# Patient Record
Sex: FEMALE | Race: OTHER | HISPANIC OR LATINO | URBAN - METROPOLITAN AREA
[De-identification: names, ages, dates, MRNs, and addresses within clinical notes are randomized per-mention and may not be internally consistent; named-entity substitution may affect disease eponyms.]

---

## 2022-06-26 ENCOUNTER — INPATIENT (INPATIENT)
Facility: HOSPITAL | Age: 47
LOS: 8 days | Discharge: HOME | End: 2022-07-05
Attending: INTERNAL MEDICINE | Admitting: INTERNAL MEDICINE
Payer: MEDICAID

## 2022-06-26 VITALS
RESPIRATION RATE: 18 BRPM | HEART RATE: 76 BPM | DIASTOLIC BLOOD PRESSURE: 73 MMHG | TEMPERATURE: 98 F | OXYGEN SATURATION: 100 % | SYSTOLIC BLOOD PRESSURE: 109 MMHG

## 2022-06-26 LAB
ALBUMIN SERPL ELPH-MCNC: 3.9 G/DL — SIGNIFICANT CHANGE UP (ref 3.3–5)
ALP SERPL-CCNC: 69 U/L — SIGNIFICANT CHANGE UP (ref 40–120)
ALT FLD-CCNC: 10 U/L — SIGNIFICANT CHANGE UP (ref 4–33)
ANION GAP SERPL CALC-SCNC: 11 MMOL/L — SIGNIFICANT CHANGE UP (ref 7–14)
AST SERPL-CCNC: 13 U/L — SIGNIFICANT CHANGE UP (ref 4–32)
BASOPHILS # BLD AUTO: 0 K/UL — SIGNIFICANT CHANGE UP (ref 0–0.2)
BASOPHILS NFR BLD AUTO: 0 % — SIGNIFICANT CHANGE UP (ref 0–2)
BILIRUB SERPL-MCNC: 0.3 MG/DL — SIGNIFICANT CHANGE UP (ref 0.2–1.2)
BUN SERPL-MCNC: 12 MG/DL — SIGNIFICANT CHANGE UP (ref 7–23)
CALCIUM SERPL-MCNC: 8.8 MG/DL — SIGNIFICANT CHANGE UP (ref 8.4–10.5)
CHLORIDE SERPL-SCNC: 104 MMOL/L — SIGNIFICANT CHANGE UP (ref 98–107)
CO2 SERPL-SCNC: 24 MMOL/L — SIGNIFICANT CHANGE UP (ref 22–31)
CREAT SERPL-MCNC: 0.56 MG/DL — SIGNIFICANT CHANGE UP (ref 0.5–1.3)
EGFR: 112 ML/MIN/1.73M2 — SIGNIFICANT CHANGE UP
EOSINOPHIL # BLD AUTO: 0.06 K/UL — SIGNIFICANT CHANGE UP (ref 0–0.5)
EOSINOPHIL NFR BLD AUTO: 0.9 % — SIGNIFICANT CHANGE UP (ref 0–6)
GLUCOSE SERPL-MCNC: 108 MG/DL — HIGH (ref 70–99)
HCT VFR BLD CALC: 33.3 % — LOW (ref 34.5–45)
HGB BLD-MCNC: 9.7 G/DL — LOW (ref 11.5–15.5)
IANC: 3.18 K/UL — SIGNIFICANT CHANGE UP (ref 1.8–7.4)
LYMPHOCYTES # BLD AUTO: 3.49 K/UL — HIGH (ref 1–3.3)
LYMPHOCYTES # BLD AUTO: 48.7 % — HIGH (ref 13–44)
MCHC RBC-ENTMCNC: 21.2 PG — LOW (ref 27–34)
MCHC RBC-ENTMCNC: 29.1 GM/DL — LOW (ref 32–36)
MCV RBC AUTO: 72.9 FL — LOW (ref 80–100)
MONOCYTES # BLD AUTO: 0.19 K/UL — SIGNIFICANT CHANGE UP (ref 0–0.9)
MONOCYTES NFR BLD AUTO: 2.6 % — SIGNIFICANT CHANGE UP (ref 2–14)
NEUTROPHILS # BLD AUTO: 3.36 K/UL — SIGNIFICANT CHANGE UP (ref 1.8–7.4)
NEUTROPHILS NFR BLD AUTO: 46.9 % — SIGNIFICANT CHANGE UP (ref 43–77)
PLATELET # BLD AUTO: 323 K/UL — SIGNIFICANT CHANGE UP (ref 150–400)
POTASSIUM SERPL-MCNC: 3.5 MMOL/L — SIGNIFICANT CHANGE UP (ref 3.5–5.3)
POTASSIUM SERPL-SCNC: 3.5 MMOL/L — SIGNIFICANT CHANGE UP (ref 3.5–5.3)
PROT SERPL-MCNC: 7 G/DL — SIGNIFICANT CHANGE UP (ref 6–8.3)
RBC # BLD: 4.57 M/UL — SIGNIFICANT CHANGE UP (ref 3.8–5.2)
RBC # FLD: 19.3 % — HIGH (ref 10.3–14.5)
SODIUM SERPL-SCNC: 139 MMOL/L — SIGNIFICANT CHANGE UP (ref 135–145)
WBC # BLD: 7.16 K/UL — SIGNIFICANT CHANGE UP (ref 3.8–10.5)
WBC # FLD AUTO: 7.16 K/UL — SIGNIFICANT CHANGE UP (ref 3.8–10.5)

## 2022-06-26 PROCEDURE — 72100 X-RAY EXAM L-S SPINE 2/3 VWS: CPT | Mod: 26

## 2022-06-26 PROCEDURE — 72074 X-RAY EXAM THORAC SPINE4/>VW: CPT | Mod: 26

## 2022-06-26 PROCEDURE — 99285 EMERGENCY DEPT VISIT HI MDM: CPT

## 2022-06-26 RX ORDER — DEXAMETHASONE 0.5 MG/5ML
10 ELIXIR ORAL ONCE
Refills: 0 | Status: COMPLETED | OUTPATIENT
Start: 2022-06-26 | End: 2022-06-26

## 2022-06-26 RX ORDER — MORPHINE SULFATE 50 MG/1
4 CAPSULE, EXTENDED RELEASE ORAL ONCE
Refills: 0 | Status: DISCONTINUED | OUTPATIENT
Start: 2022-06-26 | End: 2022-06-26

## 2022-06-26 RX ORDER — SODIUM CHLORIDE 9 MG/ML
1000 INJECTION INTRAMUSCULAR; INTRAVENOUS; SUBCUTANEOUS ONCE
Refills: 0 | Status: COMPLETED | OUTPATIENT
Start: 2022-06-26 | End: 2022-06-26

## 2022-06-26 RX ADMIN — Medication 102 MILLIGRAM(S): at 22:36

## 2022-06-26 RX ADMIN — MORPHINE SULFATE 4 MILLIGRAM(S): 50 CAPSULE, EXTENDED RELEASE ORAL at 22:35

## 2022-06-26 RX ADMIN — SODIUM CHLORIDE 1000 MILLILITER(S): 9 INJECTION INTRAMUSCULAR; INTRAVENOUS; SUBCUTANEOUS at 22:36

## 2022-06-26 NOTE — ED ADULT TRIAGE NOTE - CHIEF COMPLAINT QUOTE
pt c/o bilateral leg and neck pain X 1 month and back pain X 3 months. States had leg surgery on both legs 1 month ago and leg and neck pain started after, does not know name or surgery. Denies falls, trauma or injury.

## 2022-06-26 NOTE — ED PROVIDER NOTE - WET READ LAUNCH FT
S/w pt. Aware of recalls - added pt to EGD and Colon Recalls. Pt is now scheduled for f/u in September.    There are no Wet Read(s) to document.

## 2022-06-26 NOTE — ED PROVIDER NOTE - ATTENDING APP SHARED VISIT CONTRIBUTION OF CARE
48 yo with severe back pain to the T and L spine.  No radiation of the pain to the back of legs.  Has been present for the last 10 days and getting progressively worse.  No bowel or bladder problems and no weakness.    Gen: Well appearing in NAD  Head: NC/AT  Neck: trachea midline  Resp:  No distress  Ext: no deformities  Neuro:  A&O appears non focal  Skin:  Warm and dry as visualized  Psych:  Normal affect and mood     48 yo with sevre back pain.  Unsure of an exact etiology.  There are no signs of cord compression or GBS.  Due to degree of pain and inabilaity to contorl in ED will need admission for pain control and MRI and PT/OT

## 2022-06-26 NOTE — ED PROVIDER NOTE - CLINICAL SUMMARY MEDICAL DECISION MAKING FREE TEXT BOX
46 y/o female, had heat ablation to b/l medial aspect of thighs x 1 month ago for varicose veins, since then has been having severe back pain from t-l spine, + b/l generalized leg pain, went to [facility] 10 days ago received cortisone shots in b/l  thighs, however pain is not getting any better, + has been taking robaxin from pmd with no relief, denies any HA, neck pain, visual disturbances, f/c/n/v/d, chest pain, sob, abdominal pain, urinary symptoms, saddle anesthesia,  loss of bowel/bladder, recent travel, sick contact  on exam: RHETT MATHEW:  xr's show no acute pathology, pain meds, steropids given with no relief, will be admitted for inability to ambulate  pain meds, steorids, xr, reasses

## 2022-06-26 NOTE — ED PROVIDER NOTE - OBJECTIVE STATEMENT
48 y/o female, had heat ablation to b/l medial aspect of thighs x 1 month ago for varicose veins, since then has been having severe back pain from t-l spine, + b/l generalized leg pain, went to [facility] 10 days ago received cortisone shots in b/l  thighs, however pain is not getting any better, + has been taking robaxin from pmd with no relief, denies any HA, neck pain, visual disturbances, f/c/n/v/d, chest pain, sob, abdominal pain, urinary symptoms, saddle anesthesia,  loss of bowel/bladder, recent travel, sick contact

## 2022-06-26 NOTE — ED ADULT NURSE NOTE - OBJECTIVE STATEMENT
Patient came in with the complaints of bilateral leg pain, neck and back pain. As per patient, she had surgery done on both legs a month ago. No other complaints. Specimens collected and sent. Medications given as ordered. Patient tolerated well. Nursing care continues

## 2022-06-26 NOTE — ED PROVIDER NOTE - PHYSICAL EXAMINATION
msk: + mild TTP of lumbar/thoracic spine, no erythema, warmth, no rashes seen  neuro: + 5/5 strength b/l legs

## 2022-06-27 DIAGNOSIS — M54.16 RADICULOPATHY, LUMBAR REGION: ICD-10-CM

## 2022-06-27 DIAGNOSIS — I83.90 ASYMPTOMATIC VARICOSE VEINS OF UNSPECIFIED LOWER EXTREMITY: Chronic | ICD-10-CM

## 2022-06-27 DIAGNOSIS — R26.2 DIFFICULTY IN WALKING, NOT ELSEWHERE CLASSIFIED: ICD-10-CM

## 2022-06-27 DIAGNOSIS — M54.9 DORSALGIA, UNSPECIFIED: ICD-10-CM

## 2022-06-27 DIAGNOSIS — Z29.9 ENCOUNTER FOR PROPHYLACTIC MEASURES, UNSPECIFIED: ICD-10-CM

## 2022-06-27 DIAGNOSIS — D50.9 IRON DEFICIENCY ANEMIA, UNSPECIFIED: ICD-10-CM

## 2022-06-27 LAB
ANION GAP SERPL CALC-SCNC: 8 MMOL/L — SIGNIFICANT CHANGE UP (ref 7–14)
BASOPHILS # BLD AUTO: 0 K/UL — SIGNIFICANT CHANGE UP (ref 0–0.2)
BASOPHILS NFR BLD AUTO: 0 % — SIGNIFICANT CHANGE UP (ref 0–2)
BUN SERPL-MCNC: 10 MG/DL — SIGNIFICANT CHANGE UP (ref 7–23)
CALCIUM SERPL-MCNC: 8.6 MG/DL — SIGNIFICANT CHANGE UP (ref 8.4–10.5)
CHLORIDE SERPL-SCNC: 108 MMOL/L — HIGH (ref 98–107)
CO2 SERPL-SCNC: 20 MMOL/L — LOW (ref 22–31)
CREAT SERPL-MCNC: 0.43 MG/DL — LOW (ref 0.5–1.3)
CRP SERPL-MCNC: <3 MG/L — SIGNIFICANT CHANGE UP
EGFR: 119 ML/MIN/1.73M2 — SIGNIFICANT CHANGE UP
EOSINOPHIL # BLD AUTO: 0 K/UL — SIGNIFICANT CHANGE UP (ref 0–0.5)
EOSINOPHIL NFR BLD AUTO: 0 % — SIGNIFICANT CHANGE UP (ref 0–6)
ERYTHROCYTE [SEDIMENTATION RATE] IN BLOOD: 13 MM/HR — SIGNIFICANT CHANGE UP (ref 4–25)
FERRITIN SERPL-MCNC: 5 NG/ML — LOW (ref 15–150)
FOLATE SERPL-MCNC: 12 NG/ML — SIGNIFICANT CHANGE UP (ref 3.1–17.5)
GLUCOSE SERPL-MCNC: 154 MG/DL — HIGH (ref 70–99)
HCT VFR BLD CALC: 34.3 % — LOW (ref 34.5–45)
HGB BLD-MCNC: 10.3 G/DL — LOW (ref 11.5–15.5)
IANC: 5.31 K/UL — SIGNIFICANT CHANGE UP (ref 1.8–7.4)
IMM GRANULOCYTES NFR BLD AUTO: 0.3 % — SIGNIFICANT CHANGE UP (ref 0–1.5)
IRON SATN MFR SERPL: 12 UG/DL — LOW (ref 30–160)
IRON SATN MFR SERPL: 3 % — LOW (ref 14–50)
LYMPHOCYTES # BLD AUTO: 0.75 K/UL — LOW (ref 1–3.3)
LYMPHOCYTES # BLD AUTO: 12.1 % — LOW (ref 13–44)
MAGNESIUM SERPL-MCNC: 2.1 MG/DL — SIGNIFICANT CHANGE UP (ref 1.6–2.6)
MCHC RBC-ENTMCNC: 21.8 PG — LOW (ref 27–34)
MCHC RBC-ENTMCNC: 30 GM/DL — LOW (ref 32–36)
MCV RBC AUTO: 72.7 FL — LOW (ref 80–100)
MONOCYTES # BLD AUTO: 0.1 K/UL — SIGNIFICANT CHANGE UP (ref 0–0.9)
MONOCYTES NFR BLD AUTO: 1.6 % — LOW (ref 2–14)
NEUTROPHILS # BLD AUTO: 5.31 K/UL — SIGNIFICANT CHANGE UP (ref 1.8–7.4)
NEUTROPHILS NFR BLD AUTO: 86 % — HIGH (ref 43–77)
NRBC # BLD: 0 /100 WBCS — SIGNIFICANT CHANGE UP
NRBC # FLD: 0 K/UL — SIGNIFICANT CHANGE UP
PHOSPHATE SERPL-MCNC: 2.7 MG/DL — SIGNIFICANT CHANGE UP (ref 2.5–4.5)
PLATELET # BLD AUTO: 328 K/UL — SIGNIFICANT CHANGE UP (ref 150–400)
POTASSIUM SERPL-MCNC: 3.8 MMOL/L — SIGNIFICANT CHANGE UP (ref 3.5–5.3)
POTASSIUM SERPL-SCNC: 3.8 MMOL/L — SIGNIFICANT CHANGE UP (ref 3.5–5.3)
RBC # BLD: 4.68 M/UL — SIGNIFICANT CHANGE UP (ref 3.8–5.2)
RBC # BLD: 4.72 M/UL — SIGNIFICANT CHANGE UP (ref 3.8–5.2)
RBC # FLD: 19.5 % — HIGH (ref 10.3–14.5)
RETICS #: 55.2 K/UL — SIGNIFICANT CHANGE UP (ref 25–125)
RETICS/RBC NFR: 1.2 % — SIGNIFICANT CHANGE UP (ref 0.5–2.5)
SARS-COV-2 RNA SPEC QL NAA+PROBE: SIGNIFICANT CHANGE UP
SODIUM SERPL-SCNC: 136 MMOL/L — SIGNIFICANT CHANGE UP (ref 135–145)
TIBC SERPL-MCNC: 415 UG/DL — SIGNIFICANT CHANGE UP (ref 220–430)
UIBC SERPL-MCNC: 403 UG/DL — HIGH (ref 110–370)
VIT B12 SERPL-MCNC: 454 PG/ML — SIGNIFICANT CHANGE UP (ref 200–900)
WBC # BLD: 6.18 K/UL — SIGNIFICANT CHANGE UP (ref 3.8–10.5)
WBC # FLD AUTO: 6.18 K/UL — SIGNIFICANT CHANGE UP (ref 3.8–10.5)

## 2022-06-27 PROCEDURE — 99223 1ST HOSP IP/OBS HIGH 75: CPT | Mod: GC

## 2022-06-27 RX ORDER — GABAPENTIN 400 MG/1
100 CAPSULE ORAL THREE TIMES A DAY
Refills: 0 | Status: DISCONTINUED | OUTPATIENT
Start: 2022-06-27 | End: 2022-07-01

## 2022-06-27 RX ORDER — LIDOCAINE 4 G/100G
1 CREAM TOPICAL DAILY
Refills: 0 | Status: DISCONTINUED | OUTPATIENT
Start: 2022-06-27 | End: 2022-07-05

## 2022-06-27 RX ORDER — ENOXAPARIN SODIUM 100 MG/ML
40 INJECTION SUBCUTANEOUS EVERY 24 HOURS
Refills: 0 | Status: DISCONTINUED | OUTPATIENT
Start: 2022-06-27 | End: 2022-07-05

## 2022-06-27 RX ORDER — MORPHINE SULFATE 50 MG/1
3 CAPSULE, EXTENDED RELEASE ORAL EVERY 6 HOURS
Refills: 0 | Status: DISCONTINUED | OUTPATIENT
Start: 2022-06-27 | End: 2022-06-28

## 2022-06-27 RX ORDER — SENNA PLUS 8.6 MG/1
2 TABLET ORAL AT BEDTIME
Refills: 0 | Status: DISCONTINUED | OUTPATIENT
Start: 2022-06-27 | End: 2022-07-05

## 2022-06-27 RX ORDER — MORPHINE SULFATE 50 MG/1
2 CAPSULE, EXTENDED RELEASE ORAL EVERY 4 HOURS
Refills: 0 | Status: DISCONTINUED | OUTPATIENT
Start: 2022-06-27 | End: 2022-06-28

## 2022-06-27 RX ORDER — POLYETHYLENE GLYCOL 3350 17 G/17G
17 POWDER, FOR SOLUTION ORAL DAILY
Refills: 0 | Status: DISCONTINUED | OUTPATIENT
Start: 2022-06-27 | End: 2022-07-05

## 2022-06-27 RX ORDER — ACETAMINOPHEN 500 MG
1000 TABLET ORAL EVERY 12 HOURS
Refills: 0 | Status: COMPLETED | OUTPATIENT
Start: 2022-06-27 | End: 2022-06-28

## 2022-06-27 RX ORDER — LANOLIN ALCOHOL/MO/W.PET/CERES
3 CREAM (GRAM) TOPICAL AT BEDTIME
Refills: 0 | Status: DISCONTINUED | OUTPATIENT
Start: 2022-06-27 | End: 2022-07-05

## 2022-06-27 RX ADMIN — MORPHINE SULFATE 3 MILLIGRAM(S): 50 CAPSULE, EXTENDED RELEASE ORAL at 11:14

## 2022-06-27 RX ADMIN — GABAPENTIN 100 MILLIGRAM(S): 400 CAPSULE ORAL at 14:33

## 2022-06-27 RX ADMIN — ENOXAPARIN SODIUM 40 MILLIGRAM(S): 100 INJECTION SUBCUTANEOUS at 11:50

## 2022-06-27 RX ADMIN — POLYETHYLENE GLYCOL 3350 17 GRAM(S): 17 POWDER, FOR SOLUTION ORAL at 11:51

## 2022-06-27 RX ADMIN — Medication 1000 MILLIGRAM(S): at 07:18

## 2022-06-27 RX ADMIN — MORPHINE SULFATE 3 MILLIGRAM(S): 50 CAPSULE, EXTENDED RELEASE ORAL at 09:51

## 2022-06-27 RX ADMIN — GABAPENTIN 100 MILLIGRAM(S): 400 CAPSULE ORAL at 21:16

## 2022-06-27 RX ADMIN — LIDOCAINE 1 PATCH: 4 CREAM TOPICAL at 07:18

## 2022-06-27 RX ADMIN — Medication 1000 MILLIGRAM(S): at 17:35

## 2022-06-27 RX ADMIN — LIDOCAINE 1 PATCH: 4 CREAM TOPICAL at 20:30

## 2022-06-27 RX ADMIN — SENNA PLUS 2 TABLET(S): 8.6 TABLET ORAL at 21:15

## 2022-06-27 RX ADMIN — GABAPENTIN 100 MILLIGRAM(S): 400 CAPSULE ORAL at 07:18

## 2022-06-27 RX ADMIN — MORPHINE SULFATE 2 MILLIGRAM(S): 50 CAPSULE, EXTENDED RELEASE ORAL at 17:36

## 2022-06-27 RX ADMIN — MORPHINE SULFATE 2 MILLIGRAM(S): 50 CAPSULE, EXTENDED RELEASE ORAL at 18:36

## 2022-06-27 NOTE — PATIENT PROFILE ADULT - FALL HARM RISK - RISK INTERVENTIONS

## 2022-06-27 NOTE — H&P ADULT - NSHPPHYSICALEXAM_GEN_ALL_CORE
PHYSICAL EXAM:  GENERAL: NAD, lying in bed comfortably  HEAD:  Atraumatic, Normocephalic  EYES: EOMI, PERRLA, conjunctiva and sclera clear  ENT: Moist mucous membranes  NECK: Supple, No JVD  CHEST/LUNG: Clear to auscultation bilaterally; No rales, rhonchi, wheezing, or rubs. Unlabored respirations  HEART: Regular rate and rhythm; No murmurs, rubs, or gallops  ABDOMEN: Bowel sounds present; Soft, Nontender, Nondistended.  EXTREMITIES:  2+ Peripheral Pulses, brisk capillary refill. No clubbing, cyanosis, or edema  NERVOUS SYSTEM: Alert & Oriented X3, speech clear. No deficits   MSK: +straight leg raise,   SKIN: No rashes or lesions PHYSICAL EXAM:  GENERAL: NAD, lying in bed comfortably  HEAD:  Atraumatic, Normocephalic  EYES: EOMI, PERRLA, conjunctiva and sclera clear  ENT: Moist mucous membranes  NECK: Supple, No JVD  CHEST/LUNG: Clear to auscultation bilaterally; No rales, rhonchi, wheezing, or rubs. Unlabored respirations  HEART: Regular rate and rhythm; No murmurs, rubs, or gallops  ABDOMEN: Bowel sounds present; Soft, Nontender, Nondistended.  EXTREMITIES:  2+ Peripheral Pulses, brisk capillary refill. No clubbing, cyanosis, or edema  NERVOUS SYSTEM: Alert & Oriented X3, speech clear. No deficits   MSK: +straight leg raise, 3/5 strength flexion bilateral lower extremities, sensation intact throughout  SKIN: No rashes or lesions

## 2022-06-27 NOTE — H&P ADULT - NSHPREVIEWOFSYSTEMS_GEN_ALL_CORE
REVIEW OF SYSTEMS:    CONSTITUTIONAL: No weakness, fevers or chills  EYES/ENT: No visual changes;  No vertigo or throat pain   NECK: No pain or stiffness  RESPIRATORY: No cough, wheezing, hemoptysis; No shortness of breath  CARDIOVASCULAR: No chest pain or palpitations  GASTROINTESTINAL: No abdominal or epigastric pain. No nausea, vomiting, or hematemesis; No diarrhea or constipation. No melena or hematochezia.  GENITOURINARY: No dysuria, frequency or hematuria  NEUROLOGICAL: +back pain  SKIN: No itching, rashes REVIEW OF SYSTEMS:    CONSTITUTIONAL: No weakness, fevers or chills  EYES/ENT: No visual changes;  No vertigo or throat pain   NECK: No pain or stiffness  RESPIRATORY: No cough, wheezing, hemoptysis; No shortness of breath  CARDIOVASCULAR: No chest pain or palpitations  GASTROINTESTINAL: No abdominal or epigastric pain. No nausea, vomiting, or hematemesis; No diarrhea or constipation. No melena or hematochezia.  GENITOURINARY: No dysuria, frequency or hematuria  NEUROLOGICAL: +back pain  SKIN: No itching, rashes    additional elements of ROS below

## 2022-06-27 NOTE — H&P ADULT - PROBLEM SELECTOR PLAN 3
DVT ppx: lovenox  Diet: regular  Code status: full code  Disposition: admit to medicine Admitted with hemoglobin of 9.7, unknown baseline. MCV 73; unclear etiology; chronic inflammation, possible thalassemia vs chronic blood loss;   - f/u anemia workup, including iron, ferritin, b12, folate, retic  - occult blood

## 2022-06-27 NOTE — H&P ADULT - HISTORY OF PRESENT ILLNESS
Margie Gallegos is a 48 y/o female with no significant PMHx who presents with two months of worsening back pain. The pain started 3-4 days after she had a heat ablation for varicose veins at a private clinic. The pain started in her lower back and has become more severe while spreading bilaterally down the back of her legs. She describes the pain as a constant burning sensation. Walking worsens the pain, she must lean on the walls with assistance. She denies loss of sensation. She does not report any bowel or bladder incontinence. She denies recent sick contacts, flus, or fever. She denies recreational drug use of IV drug use. She went to [facility] for last week to see her doctor where they took an xray and administered a steroid back injection which provided no relief. She takes naproxen and cyclobenzaprine at home with minimal relief.     In ED, vitals afebrile, HR 70s, /73, saturating 100%. Labs significant for hemoglobin 9.7. Imaging significant for lumbar and thoracic xray without acute fracture or abnormality. Received dexamethasone, morphine, and 1L NS in the ED.   #[id number]    Margie Gallegos is a 48 y/o female with no significant PMHx who presents with two months of worsening back pain. The pain started 3-4 days after she had a heat ablation for varicose veins at a private clinic. The pain started in her lower back and has become more severe while spreading bilaterally down the back of her legs. She describes the pain as a constant burning sensation. Walking worsens the pain, she must lean on the walls with assistance. She denies loss of sensation. She does not report any bowel or bladder incontinence. She denies recent sick contacts, flus, or fever. She denies recreational drug use of IV drug use. She went to [facility] for last week to see her doctor where they took an xray and administered a steroid back injection which provided no relief. She takes naproxen and cyclobenzaprine at home with minimal relief.     In ED, vitals afebrile, HR 70s, /73, saturating 100%. Labs significant for hemoglobin 9.7. Imaging significant for lumbar and thoracic xray without acute fracture or abnormality. Received dexamethasone, morphine, and 1L NS in the ED.   #[id number]  48 y/o female with no significant MHx who presents with two months of worsening back pain. The pain started 3-4 days after she had a heat ablation for varicose veins at a private clinic. The pain started in her lower back and has become more severe while spreading bilaterally down the back of her legs. She describes the pain as a constant severe burning sensation. Walking worsens the pain, she must lean on the walls for balance assistance. She denies loss of sensation. She does not report any bowel or bladder incontinence. She denies recent sick contacts, flus, or fever. She denies recreational drug use of IV drug use. She went to [facility] for last week to see her doctor where they took an xray and administered a steroid back injection which provided no relief. She takes naproxen and cyclobenzaprine at home with minimal relief.     ED course:  afebrile, HR 70s, /73, saturating 100%. Received dexamethasone, morphine, and 1L NS

## 2022-06-27 NOTE — H&P ADULT - REASON FOR ADMISSION
Back pain Patient is a 73y old  Female who presents with a chief complaint of cardiac cath (31 Mar 2021 07:35)      INTERVAL HPI/OVERNIGHT EVENTS:  Patient was seen and examined at bedside. As per nurse and patient, no o/n events, patient resting comfortably. No complaints at this time. Patient denies: fever, chills, dizziness, weakness, HA, Changes in vision, CP, palpitations, SOB, cough, N/V/D/C, dysuria, changes in bowel movements, LE edema.      PAST MEDICAL & SURGICAL HISTORY:  HTN (hypertension)    HLD (hyperlipidemia)    DM (diabetes mellitus)    Breast cancer    H/O:     S/P appendectomy    S/P dilation and curettage    H/O lumpectomy      T(C): 36.3 (21 @ 09:35), Max: 37.2 (21 @ 17:25)  HR: 72 (21 @ 09:00) (72 - 96)  BP: 133/59 (21 @ 09:00) (106/52 - 193/94)  RR: 20 (21 @ 09:00) (16 - 20)  SpO2: 99% (21 @ 09:00) (98% - 100%)  Wt(kg): --  I&O's Summary    2021 07:01  -  2021 07:00  --------------------------------------------------------  IN: 200 mL / OUT: 0 mL / NET: 200 mL        PHYSICAL EXAM:  GENERAL: NAD, laying comfortably in bed  HEAD:  Atraumatic, Normocephalic  EYES: EOMI, PERRLA, conjunctiva and sclera clear  ENMT: No tonsillar erythema, exudates, or enlargement; MMM  NECK: Supple, No JVD  NERVOUS SYSTEM:  Alert & Oriented X3, no focal deficits   CHEST/LUNG: Clear to percussion bilaterally; No rales, rhonchi, wheezing, or rubs  HEART: Regular rate and rhythm; No murmurs, rubs, or gallops  ABDOMEN: Soft, Nontender, Nondistended; Bowel sounds present  EXTREMITIES:  2+ Peripheral Pulses, No clubbing, cyanosis, or edema  LYMPH: No lymphadenopathy noted  SKIN: No rashes or lesions        LABS:                        9.7    3.58  )-----------( 140      ( 2021 06:39 )             31.3     04-    138  |  104  |  20  ----------------------------<  293<H>  4.7   |  22  |  0.86    Ca    8.3<L>      2021 06:39  Phos  2.9       Mg     1.8         TPro  6.0  /  Alb  3.2<L>  /  TBili  0.5  /  DBili  x   /  AST  28  /  ALT  12  /  AlkPhos  89          CAPILLARY BLOOD GLUCOSE      POCT Blood Glucose.: 261 mg/dL (2021 11:56)  POCT Blood Glucose.: 311 mg/dL (2021 06:35)  POCT Blood Glucose.: 193 mg/dL (2021 21:54)  POCT Blood Glucose.: 370 mg/dL (2021 19:45)  POCT Blood Glucose.: 468 mg/dL (2021 18:18)  POCT Blood Glucose.: 454 mg/dL (2021 17:05)  POCT Blood Glucose.: 515 mg/dL (2021 16:21)            MEDICATIONS  (STANDING):  aspirin enteric coated 81 milliGRAM(s) Oral daily  atorvastatin 80 milliGRAM(s) Oral at bedtime  chlorhexidine 2% Cloths 1 Application(s) Topical <User Schedule>  clopidogrel Tablet 75 milliGRAM(s) Oral daily  coronavirus (EUA) Vaccine ([employer] 0.5 milliLiter(s) IntraMuscular once  dextrose 40% Gel 15 Gram(s) Oral Once  dextrose 5%. 1000 milliLiter(s) (50 mL/Hr) IV Continuous <Continuous>  dextrose 5%. 1000 milliLiter(s) (100 mL/Hr) IV Continuous <Continuous>  dextrose 50% Injectable 25 Gram(s) IV Push Once  dextrose 50% Injectable 12.5 Gram(s) IV Push Once  dextrose 50% Injectable 25 Gram(s) IV Push Once  glucagon  Injectable 1 milliGRAM(s) IntraMuscular Once  heparin   Injectable 5000 Unit(s) SubCutaneous every 8 hours  insulin glargine Injectable (LANTUS) 12 Unit(s) SubCutaneous every morning  insulin lispro (ADMELOG) corrective regimen sliding scale   SubCutaneous Before meals and at bedtime  insulin lispro Injectable (ADMELOG) 7 Unit(s) SubCutaneous three times a day with meals  lisinopril 10 milliGRAM(s) Oral daily  metoprolol succinate ER 50 milliGRAM(s) Oral daily    MEDICATIONS  (PRN):  acetaminophen   Tablet .. 650 milliGRAM(s) Oral every 6 hours PRN Temp greater or equal to 38C (100.4F)  ondansetron Injectable 4 milliGRAM(s) IV Push every 6 hours PRN Nausea and/or Vomiting      RADIOLOGY & ADDITIONAL TESTS:    Imaging Personally Reviewed:  [ ] YES  [ ] NO    Consultant(s) Notes Reviewed:  [ ] YES  [ ] NO    Care Discussed with Consultants/Other Providers [ ] YES  [ ] NO

## 2022-06-27 NOTE — PHYSICAL THERAPY INITIAL EVALUATION ADULT - PERTINENT HX OF CURRENT PROBLEM, REHAB EVAL
Patient is 47 year old female with no significant MHx who presents with two months of worsening back pain.

## 2022-06-27 NOTE — H&P ADULT - ATTENDING COMMENTS
46 y/o female with no significant MHx, recent ablation for varicose veins a/w severe lower back pain with difficulty walking due to likely bilateral lumbar radiculopathy r/o discitis; 48 y/o female with no significant MHx, recent ablation for varicose veins a/w severe lower back pain with difficulty walking due to likely bilateral lumbar radiculopathy r/o discitis;    Assessment and plan supplemented and modified by attending where indicated;

## 2022-06-27 NOTE — H&P ADULT - PROBLEM SELECTOR PLAN 1
Ongoing for 2 months, s/p heat ablation for varicose veins, unknown if this is a known complication from this procedure.   - s/p cortisone injection at [place] about one week ago  - etiology likely from recent procedure, less likely abscess without leukocytosis or fever  - standing tylenol for pain, lidocaine patch daily  - IV morphine PRN for moderate and severe pain  - order ESR, CRP  - Lumbosacral MRI w/wo ordered New and worsening for 2 months c/b difficulty walking and maintaining balance;  s/p cortisone injection at [place] about one week ago; r/o discitis;  - hold abx  - standing Tylenol for pain, lidocaine patch daily  - start gabapentin   - IV morphine 2mg IV PRN for moderate and 3mg IVP severe pain  - ordered ESR, CRP  - Lumbosacral MRI w/wo ordered to r/o disk herniation, spinal stenosis, discitis New and worsening for 2 months c/b difficulty walking and maintaining balance;  s/p cortisone injection at [place] about one week ago; r/o discitis; No prior dx of lower back pathology;   - hold abx  - standing Tylenol for pain, lidocaine patch daily  - start gabapentin   - IV morphine 2mg IV PRN for moderate and 3mg IVP severe pain  - ordered ESR, CRP  - Lumbosacral MRI w/wo ordered to r/o disk herniation, spinal stenosis, discitis  - f/u screening EKG, ordered

## 2022-06-27 NOTE — H&P ADULT - NS ATTEST RISK PROBLEM GEN_ALL_CORE FT
Severe lower back pain with difficulty walking due to likely bilateral lumbar radiculopathy requiring IVP Morphine;

## 2022-06-27 NOTE — H&P ADULT - NSHPSOCIALHISTORY_GEN_ALL_CORE
Lives at home with  and son. No history of alcohol, tobacco, or recreational drug use.    Lives at home with  and son  Reports no history of alcohol, tobacco, or recreational drug use

## 2022-06-27 NOTE — H&P ADULT - PROBLEM SELECTOR PLAN 2
Mammogram is nml but breasts are dense and recommend US to enhance sensitivity Admitted with hemoglobin of 9.7, unknown baseline.   - etiology possible menstruation, unsure if patient is post-menopausal, may be component of chronic disease if patient with infection/abscess vs thalssemia  - f/u anemia workup, including iron, ferritin, b12, folate, retic Due to severe lower back pain;  -plan as above  -fall precautions  -PT evaluation

## 2022-06-27 NOTE — H&P ADULT - ASSESSMENT
Margie Gallegos is a 48 y/o female with no significant PMHx who presents with two months of worsening back pain, suspect bilateral lumbar radiculopathy, admitted for pain control and further workup.  46 y/o female with no significant MHx, recent ablation for varicose veins a/w severe lower back pain with difficulty walking due to likely bilateral lumbar radiculopathy r/o discitis;

## 2022-06-27 NOTE — PATIENT PROFILE ADULT - SAFE PLACE TO LIVE
no Patient presented with chest pain and dyspnea since this AM. (+) reproducible on exam. Otherwise afebrile, HD stable, well appearing. Obtained EKG which was non-ischemic without evidence of STEMI. Obtained labs which were grossly unremarkable including no significant leukocytosis, anemia, signs of dehydration/KRISTYN, transaminitis or significant electrolyte abnormalities. Trop negative. Chest xray negative for pneumothorax, pneumonia, widened mediastinum, evidence of rib fractures, enlarged cardiac silhouette or any other emergent pathologies. Patient monitored in ED and felt better after work up. HEART score 0. Ambulatory without difficulty or desaturation, tolerates PO. Will discharge home with outpatient follow up. Patient agreeable with plan. Agrees to return to ED for any new or worsening symptoms.

## 2022-06-27 NOTE — H&P ADULT - NEGATIVE MUSCULOSKELETAL SYMPTOMS
Please f/u with your PCP in 1-2 days for a recheck  R/t to the ER as needed  Avoid breastfeeding for next 4 hours   no joint swelling/no myalgia/no neck pain

## 2022-06-27 NOTE — H&P ADULT - MOTOR
strength: grossly 5/5 flexion-extension b/l LE and UE - poor effort, limited by pain     strait leg raise passive and active limited to 15 deg b/l due to Lumbar back pain

## 2022-06-27 NOTE — PHYSICAL THERAPY INITIAL EVALUATION ADULT - GENERAL OBSERVATIONS, REHAB EVAL
Patient seen semi supine in bed, complaint of pain on bilateral medial thigh, agreed to participate in PT.

## 2022-06-28 DIAGNOSIS — M54.9 DORSALGIA, UNSPECIFIED: ICD-10-CM

## 2022-06-28 LAB
ANION GAP SERPL CALC-SCNC: 10 MMOL/L — SIGNIFICANT CHANGE UP (ref 7–14)
BUN SERPL-MCNC: 12 MG/DL — SIGNIFICANT CHANGE UP (ref 7–23)
CALCIUM SERPL-MCNC: 8.7 MG/DL — SIGNIFICANT CHANGE UP (ref 8.4–10.5)
CHLORIDE SERPL-SCNC: 104 MMOL/L — SIGNIFICANT CHANGE UP (ref 98–107)
CO2 SERPL-SCNC: 24 MMOL/L — SIGNIFICANT CHANGE UP (ref 22–31)
CREAT SERPL-MCNC: 0.58 MG/DL — SIGNIFICANT CHANGE UP (ref 0.5–1.3)
EGFR: 111 ML/MIN/1.73M2 — SIGNIFICANT CHANGE UP
GLUCOSE SERPL-MCNC: 92 MG/DL — SIGNIFICANT CHANGE UP (ref 70–99)
HCT VFR BLD CALC: 33.6 % — LOW (ref 34.5–45)
HGB BLD-MCNC: 10.2 G/DL — LOW (ref 11.5–15.5)
MAGNESIUM SERPL-MCNC: 2.2 MG/DL — SIGNIFICANT CHANGE UP (ref 1.6–2.6)
MCHC RBC-ENTMCNC: 22.1 PG — LOW (ref 27–34)
MCHC RBC-ENTMCNC: 30.4 GM/DL — LOW (ref 32–36)
MCV RBC AUTO: 72.7 FL — LOW (ref 80–100)
NRBC # BLD: 0 /100 WBCS — SIGNIFICANT CHANGE UP
NRBC # FLD: 0 K/UL — SIGNIFICANT CHANGE UP
PHOSPHATE SERPL-MCNC: 3.2 MG/DL — SIGNIFICANT CHANGE UP (ref 2.5–4.5)
PLATELET # BLD AUTO: 296 K/UL — SIGNIFICANT CHANGE UP (ref 150–400)
POTASSIUM SERPL-MCNC: 3.4 MMOL/L — LOW (ref 3.5–5.3)
POTASSIUM SERPL-SCNC: 3.4 MMOL/L — LOW (ref 3.5–5.3)
RBC # BLD: 4.62 M/UL — SIGNIFICANT CHANGE UP (ref 3.8–5.2)
RBC # FLD: 19.3 % — HIGH (ref 10.3–14.5)
SODIUM SERPL-SCNC: 138 MMOL/L — SIGNIFICANT CHANGE UP (ref 135–145)
WBC # BLD: 6.44 K/UL — SIGNIFICANT CHANGE UP (ref 3.8–10.5)
WBC # FLD AUTO: 6.44 K/UL — SIGNIFICANT CHANGE UP (ref 3.8–10.5)

## 2022-06-28 PROCEDURE — 99233 SBSQ HOSP IP/OBS HIGH 50: CPT

## 2022-06-28 RX ORDER — SODIUM CHLORIDE 9 MG/ML
1000 INJECTION, SOLUTION INTRAVENOUS ONCE
Refills: 0 | Status: COMPLETED | OUTPATIENT
Start: 2022-06-28 | End: 2022-06-28

## 2022-06-28 RX ORDER — SODIUM CHLORIDE 9 MG/ML
1000 INJECTION, SOLUTION INTRAVENOUS
Refills: 0 | Status: DISCONTINUED | OUTPATIENT
Start: 2022-06-28 | End: 2022-07-02

## 2022-06-28 RX ORDER — FERROUS SULFATE 325(65) MG
325 TABLET ORAL DAILY
Refills: 0 | Status: DISCONTINUED | OUTPATIENT
Start: 2022-06-28 | End: 2022-07-05

## 2022-06-28 RX ORDER — KETOROLAC TROMETHAMINE 30 MG/ML
30 SYRINGE (ML) INJECTION EVERY 6 HOURS
Refills: 0 | Status: DISCONTINUED | OUTPATIENT
Start: 2022-06-28 | End: 2022-07-02

## 2022-06-28 RX ORDER — POTASSIUM CHLORIDE 20 MEQ
40 PACKET (EA) ORAL EVERY 4 HOURS
Refills: 0 | Status: COMPLETED | OUTPATIENT
Start: 2022-06-28 | End: 2022-06-28

## 2022-06-28 RX ADMIN — Medication 40 MILLIEQUIVALENT(S): at 11:32

## 2022-06-28 RX ADMIN — Medication 30 MILLIGRAM(S): at 12:12

## 2022-06-28 RX ADMIN — Medication 30 MILLIGRAM(S): at 20:29

## 2022-06-28 RX ADMIN — GABAPENTIN 100 MILLIGRAM(S): 400 CAPSULE ORAL at 21:21

## 2022-06-28 RX ADMIN — LIDOCAINE 1 PATCH: 4 CREAM TOPICAL at 11:30

## 2022-06-28 RX ADMIN — Medication 30 MILLIGRAM(S): at 19:44

## 2022-06-28 RX ADMIN — SENNA PLUS 2 TABLET(S): 8.6 TABLET ORAL at 21:21

## 2022-06-28 RX ADMIN — SODIUM CHLORIDE 1000 MILLILITER(S): 9 INJECTION, SOLUTION INTRAVENOUS at 03:14

## 2022-06-28 RX ADMIN — Medication 1000 MILLIGRAM(S): at 05:29

## 2022-06-28 RX ADMIN — Medication 1000 MILLIGRAM(S): at 18:39

## 2022-06-28 RX ADMIN — GABAPENTIN 100 MILLIGRAM(S): 400 CAPSULE ORAL at 13:47

## 2022-06-28 RX ADMIN — Medication 30 MILLIGRAM(S): at 11:31

## 2022-06-28 RX ADMIN — POLYETHYLENE GLYCOL 3350 17 GRAM(S): 17 POWDER, FOR SOLUTION ORAL at 11:31

## 2022-06-28 RX ADMIN — LIDOCAINE 1 PATCH: 4 CREAM TOPICAL at 19:10

## 2022-06-28 RX ADMIN — SODIUM CHLORIDE 75 MILLILITER(S): 9 INJECTION, SOLUTION INTRAVENOUS at 11:30

## 2022-06-28 RX ADMIN — Medication 325 MILLIGRAM(S): at 13:47

## 2022-06-28 RX ADMIN — ENOXAPARIN SODIUM 40 MILLIGRAM(S): 100 INJECTION SUBCUTANEOUS at 11:31

## 2022-06-28 RX ADMIN — Medication 40 MILLIEQUIVALENT(S): at 18:39

## 2022-06-29 LAB
D DIMER BLD IA.RAPID-MCNC: <150 NG/ML DDU — SIGNIFICANT CHANGE UP
HCT VFR BLD CALC: 35.2 % — SIGNIFICANT CHANGE UP (ref 34.5–45)
HGB BLD-MCNC: 10.3 G/DL — LOW (ref 11.5–15.5)
MCHC RBC-ENTMCNC: 21.2 PG — LOW (ref 27–34)
MCHC RBC-ENTMCNC: 29.3 GM/DL — LOW (ref 32–36)
MCV RBC AUTO: 72.4 FL — LOW (ref 80–100)
NRBC # BLD: 0 /100 WBCS — SIGNIFICANT CHANGE UP
NRBC # FLD: 0 K/UL — SIGNIFICANT CHANGE UP
PLATELET # BLD AUTO: 293 K/UL — SIGNIFICANT CHANGE UP (ref 150–400)
RBC # BLD: 4.86 M/UL — SIGNIFICANT CHANGE UP (ref 3.8–5.2)
RBC # FLD: 19.3 % — HIGH (ref 10.3–14.5)
TROPONIN T, HIGH SENSITIVITY RESULT: <6 NG/L — SIGNIFICANT CHANGE UP
WBC # BLD: 6.43 K/UL — SIGNIFICANT CHANGE UP (ref 3.8–10.5)
WBC # FLD AUTO: 6.43 K/UL — SIGNIFICANT CHANGE UP (ref 3.8–10.5)

## 2022-06-29 PROCEDURE — 72158 MRI LUMBAR SPINE W/O & W/DYE: CPT | Mod: 26

## 2022-06-29 PROCEDURE — 70450 CT HEAD/BRAIN W/O DYE: CPT | Mod: 26

## 2022-06-29 PROCEDURE — 99232 SBSQ HOSP IP/OBS MODERATE 35: CPT

## 2022-06-29 PROCEDURE — 93010 ELECTROCARDIOGRAM REPORT: CPT

## 2022-06-29 PROCEDURE — 72156 MRI NECK SPINE W/O & W/DYE: CPT | Mod: 26

## 2022-06-29 RX ORDER — LIDOCAINE 4 G/100G
1 CREAM TOPICAL ONCE
Refills: 0 | Status: COMPLETED | OUTPATIENT
Start: 2022-06-29 | End: 2022-06-29

## 2022-06-29 RX ORDER — ACETAMINOPHEN 500 MG
1000 TABLET ORAL ONCE
Refills: 0 | Status: COMPLETED | OUTPATIENT
Start: 2022-06-29 | End: 2022-06-29

## 2022-06-29 RX ORDER — ACETAMINOPHEN 500 MG
650 TABLET ORAL ONCE
Refills: 0 | Status: DISCONTINUED | OUTPATIENT
Start: 2022-06-29 | End: 2022-06-29

## 2022-06-29 RX ADMIN — GABAPENTIN 100 MILLIGRAM(S): 400 CAPSULE ORAL at 16:51

## 2022-06-29 RX ADMIN — Medication 400 MILLIGRAM(S): at 09:43

## 2022-06-29 RX ADMIN — Medication 1000 MILLIGRAM(S): at 10:20

## 2022-06-29 RX ADMIN — Medication 325 MILLIGRAM(S): at 12:33

## 2022-06-29 RX ADMIN — LIDOCAINE 1 PATCH: 4 CREAM TOPICAL at 21:33

## 2022-06-29 RX ADMIN — Medication 30 MILLIGRAM(S): at 06:12

## 2022-06-29 RX ADMIN — Medication 30 MILLIGRAM(S): at 17:30

## 2022-06-29 RX ADMIN — Medication 30 MILLIGRAM(S): at 16:51

## 2022-06-29 RX ADMIN — ENOXAPARIN SODIUM 40 MILLIGRAM(S): 100 INJECTION SUBCUTANEOUS at 12:33

## 2022-06-29 RX ADMIN — GABAPENTIN 100 MILLIGRAM(S): 400 CAPSULE ORAL at 23:45

## 2022-06-29 RX ADMIN — GABAPENTIN 100 MILLIGRAM(S): 400 CAPSULE ORAL at 06:12

## 2022-06-29 RX ADMIN — LIDOCAINE 1 PATCH: 4 CREAM TOPICAL at 12:32

## 2022-06-29 RX ADMIN — Medication 30 MILLIGRAM(S): at 23:44

## 2022-06-29 NOTE — PROVIDER CONTACT NOTE (OTHER) - NAME OF MD/NP/PA/DO NOTIFIED:
Tamara ACP
Keshia Aguilera  ACP Team
<<----- Click to add NO significant Past Surgical History

## 2022-06-29 NOTE — PROVIDER CONTACT NOTE (OTHER) - ASSESSMENT
BP 78/55
Pt bp 89/59
Pt states she has generalized pain, with pain by chest area. VSS. pt denies SOB, Pt does not appear in acute distress
Pt BP 86/46

## 2022-06-29 NOTE — PROVIDER CONTACT NOTE (OTHER) - REASON
pt complains of back pain radiating to chest since this AM
BP 78/55
Pt bp 89/59- pt asymptomatic
Pt BP 86/46

## 2022-06-29 NOTE — PROVIDER CONTACT NOTE (OTHER) - ACTION/TREATMENT ORDERED:
Already filled.  
recheck vitals and let provider know what they are
Keshia Aguilera  ACP Team assessed pt at bedside. EKG done and troponins drawn. will continue to monitor
1L LR bolus and repeat vitals after done.

## 2022-06-29 NOTE — CHART NOTE - NSCHARTNOTEFT_GEN_A_CORE
notified by RN that pt c/o back pain that radiated to chest  pt seen and examined at bedside. Endorses 8-9/10 back pain that radiates to chest   denies SOB, n/v, or dizziness   exam unremarkable, lungs are clear to auscultation   EKG obtained  troponin and D-dimer WNL  will continue to monitor notified by RN that pt c/o back pain that radiated to chest  pt seen and examined at bedside. Endorses 8-9/10 back pain that radiates to chest   denies SOB, n/v, or dizziness   exam unremarkable, lungs are clear to auscultation   EKG-sinus rhythm   troponin and D-dimer WNL  will continue to monitor notified by RN that pt c/o back pain that radiated to chest  pt seen and examined at bedside. Endorses 8-9/10 back pain that radiates to chest   denies SOB, n/v, or dizziness   exam unremarkable, lungs are clear to auscultation   EKG-sinus rhythm   troponin and D-dimer WNL  will continue to monitor  history obtained via  ID #[id number]

## 2022-06-30 LAB
ANION GAP SERPL CALC-SCNC: 13 MMOL/L — SIGNIFICANT CHANGE UP (ref 7–14)
BUN SERPL-MCNC: 16 MG/DL — SIGNIFICANT CHANGE UP (ref 7–23)
CALCIUM SERPL-MCNC: 8.7 MG/DL — SIGNIFICANT CHANGE UP (ref 8.4–10.5)
CHLORIDE SERPL-SCNC: 104 MMOL/L — SIGNIFICANT CHANGE UP (ref 98–107)
CO2 SERPL-SCNC: 21 MMOL/L — LOW (ref 22–31)
CREAT SERPL-MCNC: 0.63 MG/DL — SIGNIFICANT CHANGE UP (ref 0.5–1.3)
EGFR: 109 ML/MIN/1.73M2 — SIGNIFICANT CHANGE UP
GLUCOSE SERPL-MCNC: 99 MG/DL — SIGNIFICANT CHANGE UP (ref 70–99)
HCT VFR BLD CALC: 38.3 % — SIGNIFICANT CHANGE UP (ref 34.5–45)
HGB BLD-MCNC: 11.1 G/DL — LOW (ref 11.5–15.5)
MAGNESIUM SERPL-MCNC: 2.1 MG/DL — SIGNIFICANT CHANGE UP (ref 1.6–2.6)
MCHC RBC-ENTMCNC: 21.4 PG — LOW (ref 27–34)
MCHC RBC-ENTMCNC: 29 GM/DL — LOW (ref 32–36)
MCV RBC AUTO: 73.9 FL — LOW (ref 80–100)
NRBC # BLD: 0 /100 WBCS — SIGNIFICANT CHANGE UP
NRBC # FLD: 0 K/UL — SIGNIFICANT CHANGE UP
PHOSPHATE SERPL-MCNC: 3.7 MG/DL — SIGNIFICANT CHANGE UP (ref 2.5–4.5)
PLATELET # BLD AUTO: 321 K/UL — SIGNIFICANT CHANGE UP (ref 150–400)
POTASSIUM SERPL-MCNC: 3.7 MMOL/L — SIGNIFICANT CHANGE UP (ref 3.5–5.3)
POTASSIUM SERPL-SCNC: 3.7 MMOL/L — SIGNIFICANT CHANGE UP (ref 3.5–5.3)
RBC # BLD: 5.18 M/UL — SIGNIFICANT CHANGE UP (ref 3.8–5.2)
RBC # FLD: 19.6 % — HIGH (ref 10.3–14.5)
SODIUM SERPL-SCNC: 138 MMOL/L — SIGNIFICANT CHANGE UP (ref 135–145)
WBC # BLD: 7.75 K/UL — SIGNIFICANT CHANGE UP (ref 3.8–10.5)
WBC # FLD AUTO: 7.75 K/UL — SIGNIFICANT CHANGE UP (ref 3.8–10.5)

## 2022-06-30 PROCEDURE — 99232 SBSQ HOSP IP/OBS MODERATE 35: CPT

## 2022-06-30 RX ORDER — ACETAMINOPHEN 500 MG
1000 TABLET ORAL ONCE
Refills: 0 | Status: COMPLETED | OUTPATIENT
Start: 2022-06-30 | End: 2022-06-30

## 2022-06-30 RX ADMIN — LIDOCAINE 1 PATCH: 4 CREAM TOPICAL at 13:10

## 2022-06-30 RX ADMIN — Medication 30 MILLIGRAM(S): at 00:40

## 2022-06-30 RX ADMIN — GABAPENTIN 100 MILLIGRAM(S): 400 CAPSULE ORAL at 22:11

## 2022-06-30 RX ADMIN — Medication 325 MILLIGRAM(S): at 13:11

## 2022-06-30 RX ADMIN — Medication 30 MILLIGRAM(S): at 19:19

## 2022-06-30 RX ADMIN — ENOXAPARIN SODIUM 40 MILLIGRAM(S): 100 INJECTION SUBCUTANEOUS at 13:10

## 2022-06-30 RX ADMIN — GABAPENTIN 100 MILLIGRAM(S): 400 CAPSULE ORAL at 07:15

## 2022-06-30 RX ADMIN — LIDOCAINE 1 PATCH: 4 CREAM TOPICAL at 09:51

## 2022-06-30 RX ADMIN — Medication 400 MILLIGRAM(S): at 20:02

## 2022-06-30 RX ADMIN — Medication 30 MILLIGRAM(S): at 07:15

## 2022-06-30 RX ADMIN — Medication 30 MILLIGRAM(S): at 13:19

## 2022-06-30 RX ADMIN — GABAPENTIN 100 MILLIGRAM(S): 400 CAPSULE ORAL at 15:15

## 2022-06-30 RX ADMIN — LIDOCAINE 1 PATCH: 4 CREAM TOPICAL at 18:41

## 2022-06-30 RX ADMIN — POLYETHYLENE GLYCOL 3350 17 GRAM(S): 17 POWDER, FOR SOLUTION ORAL at 13:10

## 2022-06-30 RX ADMIN — Medication 30 MILLIGRAM(S): at 20:04

## 2022-06-30 RX ADMIN — SENNA PLUS 2 TABLET(S): 8.6 TABLET ORAL at 22:12

## 2022-06-30 RX ADMIN — Medication 30 MILLIGRAM(S): at 14:10

## 2022-06-30 NOTE — CONSULT NOTE ADULT - PROBLEM SELECTOR RECOMMENDATION 9
No operative management required  PT consult  Add muscle relaxer  Increase neurontin to 300mg TID  Medrol dosepack taper

## 2022-07-01 PROCEDURE — 99232 SBSQ HOSP IP/OBS MODERATE 35: CPT

## 2022-07-01 RX ORDER — GABAPENTIN 400 MG/1
300 CAPSULE ORAL THREE TIMES A DAY
Refills: 0 | Status: DISCONTINUED | OUTPATIENT
Start: 2022-07-01 | End: 2022-07-02

## 2022-07-01 RX ORDER — CYCLOBENZAPRINE HYDROCHLORIDE 10 MG/1
5 TABLET, FILM COATED ORAL THREE TIMES A DAY
Refills: 0 | Status: DISCONTINUED | OUTPATIENT
Start: 2022-07-01 | End: 2022-07-02

## 2022-07-01 RX ADMIN — Medication 24 MILLIGRAM(S): at 17:34

## 2022-07-01 RX ADMIN — LIDOCAINE 1 PATCH: 4 CREAM TOPICAL at 19:07

## 2022-07-01 RX ADMIN — Medication 30 MILLIGRAM(S): at 06:04

## 2022-07-01 RX ADMIN — LIDOCAINE 1 PATCH: 4 CREAM TOPICAL at 12:42

## 2022-07-01 RX ADMIN — Medication 30 MILLIGRAM(S): at 07:34

## 2022-07-01 RX ADMIN — GABAPENTIN 300 MILLIGRAM(S): 400 CAPSULE ORAL at 15:12

## 2022-07-01 RX ADMIN — GABAPENTIN 100 MILLIGRAM(S): 400 CAPSULE ORAL at 06:05

## 2022-07-01 RX ADMIN — ENOXAPARIN SODIUM 40 MILLIGRAM(S): 100 INJECTION SUBCUTANEOUS at 12:41

## 2022-07-01 RX ADMIN — Medication 3 MILLIGRAM(S): at 21:29

## 2022-07-01 RX ADMIN — POLYETHYLENE GLYCOL 3350 17 GRAM(S): 17 POWDER, FOR SOLUTION ORAL at 12:42

## 2022-07-01 RX ADMIN — LIDOCAINE 1 PATCH: 4 CREAM TOPICAL at 23:47

## 2022-07-01 RX ADMIN — Medication 30 MILLIGRAM(S): at 12:41

## 2022-07-01 RX ADMIN — CYCLOBENZAPRINE HYDROCHLORIDE 5 MILLIGRAM(S): 10 TABLET, FILM COATED ORAL at 21:29

## 2022-07-01 RX ADMIN — GABAPENTIN 300 MILLIGRAM(S): 400 CAPSULE ORAL at 21:29

## 2022-07-01 RX ADMIN — Medication 30 MILLIGRAM(S): at 13:41

## 2022-07-01 RX ADMIN — SENNA PLUS 2 TABLET(S): 8.6 TABLET ORAL at 21:29

## 2022-07-01 RX ADMIN — CYCLOBENZAPRINE HYDROCHLORIDE 5 MILLIGRAM(S): 10 TABLET, FILM COATED ORAL at 15:12

## 2022-07-01 RX ADMIN — Medication 325 MILLIGRAM(S): at 12:41

## 2022-07-01 NOTE — PROGRESS NOTE ADULT - TIME BILLING
Direct patient care, interdisciplinary rounds and chart review.

## 2022-07-02 LAB
ANION GAP SERPL CALC-SCNC: 11 MMOL/L — SIGNIFICANT CHANGE UP (ref 7–14)
BUN SERPL-MCNC: 17 MG/DL — SIGNIFICANT CHANGE UP (ref 7–23)
CALCIUM SERPL-MCNC: 8.9 MG/DL — SIGNIFICANT CHANGE UP (ref 8.4–10.5)
CHLORIDE SERPL-SCNC: 104 MMOL/L — SIGNIFICANT CHANGE UP (ref 98–107)
CO2 SERPL-SCNC: 20 MMOL/L — LOW (ref 22–31)
CREAT SERPL-MCNC: 0.52 MG/DL — SIGNIFICANT CHANGE UP (ref 0.5–1.3)
EGFR: 114 ML/MIN/1.73M2 — SIGNIFICANT CHANGE UP
GLUCOSE SERPL-MCNC: 133 MG/DL — HIGH (ref 70–99)
HCT VFR BLD CALC: 36.8 % — SIGNIFICANT CHANGE UP (ref 34.5–45)
HGB BLD-MCNC: 11 G/DL — LOW (ref 11.5–15.5)
MAGNESIUM SERPL-MCNC: 2.1 MG/DL — SIGNIFICANT CHANGE UP (ref 1.6–2.6)
MCHC RBC-ENTMCNC: 21.4 PG — LOW (ref 27–34)
MCHC RBC-ENTMCNC: 29.9 GM/DL — LOW (ref 32–36)
MCV RBC AUTO: 71.6 FL — LOW (ref 80–100)
NRBC # BLD: 0 /100 WBCS — SIGNIFICANT CHANGE UP
NRBC # FLD: 0 K/UL — SIGNIFICANT CHANGE UP
PHOSPHATE SERPL-MCNC: 3.2 MG/DL — SIGNIFICANT CHANGE UP (ref 2.5–4.5)
PLATELET # BLD AUTO: 318 K/UL — SIGNIFICANT CHANGE UP (ref 150–400)
POTASSIUM SERPL-MCNC: 4.4 MMOL/L — SIGNIFICANT CHANGE UP (ref 3.5–5.3)
POTASSIUM SERPL-SCNC: 4.4 MMOL/L — SIGNIFICANT CHANGE UP (ref 3.5–5.3)
RBC # BLD: 5.14 M/UL — SIGNIFICANT CHANGE UP (ref 3.8–5.2)
RBC # FLD: 19.8 % — HIGH (ref 10.3–14.5)
SODIUM SERPL-SCNC: 135 MMOL/L — SIGNIFICANT CHANGE UP (ref 135–145)
WBC # BLD: 7.11 K/UL — SIGNIFICANT CHANGE UP (ref 3.8–10.5)
WBC # FLD AUTO: 7.11 K/UL — SIGNIFICANT CHANGE UP (ref 3.8–10.5)

## 2022-07-02 PROCEDURE — 99233 SBSQ HOSP IP/OBS HIGH 50: CPT

## 2022-07-02 RX ORDER — KETOROLAC TROMETHAMINE 30 MG/ML
30 SYRINGE (ML) INJECTION EVERY 6 HOURS
Refills: 0 | Status: DISCONTINUED | OUTPATIENT
Start: 2022-07-02 | End: 2022-07-04

## 2022-07-02 RX ORDER — CYCLOBENZAPRINE HYDROCHLORIDE 10 MG/1
10 TABLET, FILM COATED ORAL THREE TIMES A DAY
Refills: 0 | Status: DISCONTINUED | OUTPATIENT
Start: 2022-07-02 | End: 2022-07-05

## 2022-07-02 RX ORDER — TRAMADOL HYDROCHLORIDE 50 MG/1
50 TABLET ORAL EVERY 4 HOURS
Refills: 0 | Status: DISCONTINUED | OUTPATIENT
Start: 2022-07-02 | End: 2022-07-05

## 2022-07-02 RX ORDER — ACETAMINOPHEN 500 MG
650 TABLET ORAL EVERY 6 HOURS
Refills: 0 | Status: COMPLETED | OUTPATIENT
Start: 2022-07-02 | End: 2022-07-04

## 2022-07-02 RX ORDER — TRAMADOL HYDROCHLORIDE 50 MG/1
25 TABLET ORAL EVERY 4 HOURS
Refills: 0 | Status: DISCONTINUED | OUTPATIENT
Start: 2022-07-02 | End: 2022-07-05

## 2022-07-02 RX ORDER — GABAPENTIN 400 MG/1
400 CAPSULE ORAL EVERY 8 HOURS
Refills: 0 | Status: DISCONTINUED | OUTPATIENT
Start: 2022-07-02 | End: 2022-07-05

## 2022-07-02 RX ADMIN — SENNA PLUS 2 TABLET(S): 8.6 TABLET ORAL at 23:00

## 2022-07-02 RX ADMIN — GABAPENTIN 400 MILLIGRAM(S): 400 CAPSULE ORAL at 23:03

## 2022-07-02 RX ADMIN — Medication 3 MILLIGRAM(S): at 23:00

## 2022-07-02 RX ADMIN — Medication 4 MILLIGRAM(S): at 07:00

## 2022-07-02 RX ADMIN — CYCLOBENZAPRINE HYDROCHLORIDE 10 MILLIGRAM(S): 10 TABLET, FILM COATED ORAL at 23:01

## 2022-07-02 RX ADMIN — Medication 30 MILLIGRAM(S): at 17:27

## 2022-07-02 RX ADMIN — Medication 325 MILLIGRAM(S): at 12:42

## 2022-07-02 RX ADMIN — Medication 650 MILLIGRAM(S): at 17:27

## 2022-07-02 RX ADMIN — POLYETHYLENE GLYCOL 3350 17 GRAM(S): 17 POWDER, FOR SOLUTION ORAL at 12:42

## 2022-07-02 RX ADMIN — Medication 4 MILLIGRAM(S): at 12:42

## 2022-07-02 RX ADMIN — Medication 4 MILLIGRAM(S): at 18:38

## 2022-07-02 RX ADMIN — GABAPENTIN 300 MILLIGRAM(S): 400 CAPSULE ORAL at 05:58

## 2022-07-02 RX ADMIN — ENOXAPARIN SODIUM 40 MILLIGRAM(S): 100 INJECTION SUBCUTANEOUS at 12:42

## 2022-07-02 RX ADMIN — Medication 8 MILLIGRAM(S): at 23:00

## 2022-07-02 RX ADMIN — LIDOCAINE 1 PATCH: 4 CREAM TOPICAL at 19:37

## 2022-07-02 RX ADMIN — CYCLOBENZAPRINE HYDROCHLORIDE 5 MILLIGRAM(S): 10 TABLET, FILM COATED ORAL at 05:58

## 2022-07-02 RX ADMIN — LIDOCAINE 1 PATCH: 4 CREAM TOPICAL at 12:42

## 2022-07-02 NOTE — CONSULT NOTE ADULT - SUBJECTIVE AND OBJECTIVE BOX
48 y/o female with no significant MHx who presents with two months of worsening back pain. The pain started 3-4 days after she had a heat ablation for varicose veins at a private clinic. The pain started in her lower back and has become more severe while spreading bilaterally down the back of her legs. She describes the pain as a constant severe burning sensation. Walking worsens the pain, she must lean on the walls for balance assistance. She denies loss of sensation. She does not report any bowel or bladder incontinence. She denies recent sick contacts, flus, or fever. She denies recreational drug use of IV drug use. She went to [facility] for last week to see her doctor where they took an xray and administered a steroid back injection which provided no relief. She takes naproxen and cyclobenzaprine at home with minimal relief.     WDWN female in NAD  Vital Signs Last 24 Hrs  T(C): 36.6 (30 Jun 2022 18:54), Max: 37.1 (30 Jun 2022 10:45)  T(F): 97.9 (30 Jun 2022 18:54), Max: 98.7 (30 Jun 2022 10:45)  HR: 73 (30 Jun 2022 18:54) (63 - 76)  BP: 102/52 (30 Jun 2022 18:54) (98/56 - 108/63)  BP(mean): --  RR: 18 (30 Jun 2022 18:54) (16 - 18)  SpO2: 99% (30 Jun 2022 18:54) (99% - 100%)    AAO X 3  PERRLA, EOMI  face symmetric  COLEMAN strength 5/5  SILT  DTR 2+ bilaterally  No clonus    < from: MR Lumbar Spine w/wo IV Cont (06.29.22 @ 16:21) >  FINDINGS:    ALIGNMENT:  The alignment is normal.    VERTEBRAE: The vertebral bodies are normal in height. There is no   fracture or aggressive osseous lesion. Mild multilevel degenerative   changes.    DISCS: Multilevel disc desiccation and loss of height.    CONUS MEDULLARIS AND CAUDA EQUINA: The conus medullaris terminates at   T12-L1. There is normal appearance of the conus medullaris and cauda   equina.    EVALUATION OF INDIVIDUAL LEVELS:  L1-2: No disc herniation, spinal canal or neuroforaminal stenosis.    L2-3: Mild disc bulge. No spinal canal or neuroforaminal stenosis.    L3-4: Mild disc bulge. No spinal canal or neuroforaminal stenosis.    L4-5: Disc bulge with central annular fissure and superimposed shallow   central disc protrusion. No spinal canal or neuroforaminal stenosis.    L5-S1: No disc herniation, spinal canal or neuroforaminal stenosis.    LIMITED EVALUATION OF UPPER SACRUM AND SACROILIAC JOINTS: Unremarkable.    PARAVERTEBRAL SOFT TISSUES AND VISUALIZED RETROPERITONEUM: The visualized   paravertebral soft tissues appear within normal limits.    IMPRESSION:    Mild multilevel degenerative changes. No spinal canal or neuroforaminal   stenosis. Normal appearance of the conus medullaris and cauda equina.    < end of copied text >  
Chief Complaint: Neck, back, hips and bilateral leg pain.    HPI:   #[id number]  46 y/o female with no significant MHx who presents with two months of worsening back pain. The pain started 3-4 days after she had a heat ablation for varicose veins at a private clinic. The pain started in her lower back and has become more severe while spreading bilaterally down the back of her legs. She describes the pain as a constant severe burning sensation. Walking worsens the pain, she must lean on the walls for balance assistance. She denies loss of sensation. She does not report any bowel or bladder incontinence. She denies recent sick contacts, flus, or fever. She denies recreational drug use of IV drug use. She went to [facility] for last week to see her doctor where they took an xray and administered a steroid back injection which provided no relief. She takes naproxen and cyclobenzaprine at home with minimal relief.     ED course:  afebrile, HR 70s, /73, saturating 100%. Received dexamethasone, morphine, and 1L NS (27 Jun 2022 06:24)      PAST MEDICAL & SURGICAL HISTORY:  No pertinent past medical history  Varicose veins    FAMILY HISTORY:  No pertinent family history in first degree relatives    SOCIAL HISTORY:  [X ] Denies Smoking, Alcohol, or Drug Use    Allergies  No Known Allergies      PAIN MEDICATIONS:  cyclobenzaprine 5 milliGRAM(s) Oral three times a day  gabapentin 300 milliGRAM(s) Oral three times a day  ketorolac   Injectable 30 milliGRAM(s) IV Push every 6 hours PRN  melatonin 3 milliGRAM(s) Oral at bedtime PRN    Heme:  enoxaparin Injectable 40 milliGRAM(s) SubCutaneous every 24 hours    GI:  bisacodyl Suppository 10 milliGRAM(s) Rectal daily PRN  polyethylene glycol 3350 17 Gram(s) Oral daily  senna 2 Tablet(s) Oral at bedtime    Endocrine:  methylPREDNISolone   Oral   methylPREDNISolone 4 milliGRAM(s) Oral before breakfast  methylPREDNISolone 4 milliGRAM(s) Oral after lunch  methylPREDNISolone 4 milliGRAM(s) Oral after dinner  methylPREDNISolone 8 milliGRAM(s) Oral at bedtime    All Other Medications:  ferrous    sulfate 325 milliGRAM(s) Oral daily  lactated ringers. 1000 milliLiter(s) IV Continuous <Continuous>  lidocaine   4% Patch 1 Patch Transdermal daily    Vital Signs Last 24 Hrs  T(C): 36.8 (02 Jul 2022 10:12), Max: 37.1 (01 Jul 2022 21:33)  T(F): 98.3 (02 Jul 2022 10:12), Max: 98.8 (02 Jul 2022 02:14)  HR: 72 (02 Jul 2022 10:12) (66 - 73)  BP: 98/57 (02 Jul 2022 10:12) (95/49 - 112/54)  BP(mean): --  RR: 18 (02 Jul 2022 10:12) (18 - 18)  SpO2: 96% (02 Jul 2022 10:12) (96% - 100%)    PAIN SCORE: 8/10        SCALE USED: (1-10 VNRS)           LABS:                          11.0   7.11  )-----------( 318      ( 02 Jul 2022 05:37 )             36.8     07-02    135  |  104  |  17  ----------------------------<  133<H>  4.4   |  20<L>  |  0.52    Ca    8.9      02 Jul 2022 05:37  Phos  3.2     07-02  Mg     2.10     07-02        Summary:  Patient is primarily Vietnamese-speaking and understands English adequately, used  ID#[id number]. Patient states her pain is 8/10 in her neck, lower back, hips, thighs and calves that is worst with movement. She describes the pain as if someone were constantly hitting her accompanied by spasms. She also reports shooting pains from her hips down her bilateral legs and experiences some numbness in her medial and lateral thighs. She is also feeling tingling in her toes. Patient states the generalized body pain has been present for several years and has only seen her PCP. Patient's PCP prescribed her Robaxin to no relief. At home, she also takes Tylenol and uses cold packs which do not help. However, her pain became very severe resulting in patient being unable to walk for 4-5 days prior to admission. Patient reports the steroid treatment has been helping as well as Flexeril. Endorses being able to ambulate, voiding, and last bowel movement was yesterday. Denies smoking, alcohol and illicit drug use.     PHYSICAL EXAM:  GENERAL: Alert & Oriented x 3 in NAD, well-groomed, well-developed. Maintains eye contact, answers questions appropriately.    Recommendations:  -  Consider discontinuing current orders for Toradol. Instead:  -  Consider ordering IV Toradol 30mg Q6H standing x 2 days, then PRN. Not to be given within 6 hours of last dose of NSAID. Alternate with Tylenol.  -  Consider ordering PO Acetaminophen 650mg Q6H standing x 2 days, then PRN. Not to be given within 6 hours of last dose of Acetaminophen.  -  Consider discontinuing current orders for Flexeril. Instead:  -  Consider ordering PO Flexeril 10mg Q8H. Hold for oversedation.   -  Consider discontinuing current orders for Gabapentin. Instead:  -  Consider ordering PO Gabapentin 400mg Q8H. Hold for oversedation.  -  Consider ordering PO Tramadol 25mg Q4H PRN Moderate pain and 50mg Q4H PRN Severe pain. Hold for oversedation. Not to be given within 1 hour of any immediate acting opioid.  -  Consider ordering Lidocaine patches x 5 days. Apply 1 patch each to each thigh.  -  Recommend maintaining continuous pulse oximetry.  -  Recommend Physical Therapy consult for TENS therapy and strengthening exercises.   -  Recommend Dopplers ultrasound of bilateral legs to rule out DVT.  -  Recommend follow up with outpatient neurologist for numbness and tingling in legs and feet.  -  Recommend follow up with Chronic Pain doctor when discharged. If patient does not have a Chronic Pain doctor, may acquire one through patient's personal insurance carrier.  Discussed patient with Chronic Pain Attending on call, Dr. Albrecht, who agrees with the above recommendations.  No further recommendations at this time, Chronic pain service to sign off. May call Chronic Pain Service if needed.   Thank you.    [X ]  [place]  Reviewed and no medications found.

## 2022-07-03 PROCEDURE — 99233 SBSQ HOSP IP/OBS HIGH 50: CPT

## 2022-07-03 RX ADMIN — CYCLOBENZAPRINE HYDROCHLORIDE 10 MILLIGRAM(S): 10 TABLET, FILM COATED ORAL at 21:24

## 2022-07-03 RX ADMIN — LIDOCAINE 1 PATCH: 4 CREAM TOPICAL at 00:15

## 2022-07-03 RX ADMIN — LIDOCAINE 1 PATCH: 4 CREAM TOPICAL at 19:18

## 2022-07-03 RX ADMIN — POLYETHYLENE GLYCOL 3350 17 GRAM(S): 17 POWDER, FOR SOLUTION ORAL at 11:17

## 2022-07-03 RX ADMIN — Medication 650 MILLIGRAM(S): at 11:17

## 2022-07-03 RX ADMIN — GABAPENTIN 400 MILLIGRAM(S): 400 CAPSULE ORAL at 05:21

## 2022-07-03 RX ADMIN — Medication 650 MILLIGRAM(S): at 05:23

## 2022-07-03 RX ADMIN — Medication 3 MILLIGRAM(S): at 21:23

## 2022-07-03 RX ADMIN — Medication 650 MILLIGRAM(S): at 11:47

## 2022-07-03 RX ADMIN — Medication 650 MILLIGRAM(S): at 18:00

## 2022-07-03 RX ADMIN — GABAPENTIN 400 MILLIGRAM(S): 400 CAPSULE ORAL at 21:24

## 2022-07-03 RX ADMIN — Medication 30 MILLIGRAM(S): at 18:00

## 2022-07-03 RX ADMIN — CYCLOBENZAPRINE HYDROCHLORIDE 10 MILLIGRAM(S): 10 TABLET, FILM COATED ORAL at 05:23

## 2022-07-03 RX ADMIN — SENNA PLUS 2 TABLET(S): 8.6 TABLET ORAL at 21:23

## 2022-07-03 RX ADMIN — Medication 325 MILLIGRAM(S): at 11:17

## 2022-07-03 RX ADMIN — Medication 4 MILLIGRAM(S): at 18:00

## 2022-07-03 RX ADMIN — Medication 4 MILLIGRAM(S): at 13:43

## 2022-07-03 RX ADMIN — Medication 4 MILLIGRAM(S): at 21:24

## 2022-07-03 RX ADMIN — LIDOCAINE 1 PATCH: 4 CREAM TOPICAL at 11:18

## 2022-07-03 RX ADMIN — Medication 4 MILLIGRAM(S): at 07:07

## 2022-07-03 RX ADMIN — Medication 30 MILLIGRAM(S): at 05:22

## 2022-07-03 RX ADMIN — Medication 30 MILLIGRAM(S): at 11:17

## 2022-07-03 RX ADMIN — GABAPENTIN 400 MILLIGRAM(S): 400 CAPSULE ORAL at 13:44

## 2022-07-03 RX ADMIN — Medication 650 MILLIGRAM(S): at 00:02

## 2022-07-03 RX ADMIN — CYCLOBENZAPRINE HYDROCHLORIDE 10 MILLIGRAM(S): 10 TABLET, FILM COATED ORAL at 13:44

## 2022-07-03 RX ADMIN — Medication 30 MILLIGRAM(S): at 11:47

## 2022-07-03 RX ADMIN — Medication 30 MILLIGRAM(S): at 00:01

## 2022-07-03 RX ADMIN — ENOXAPARIN SODIUM 40 MILLIGRAM(S): 100 INJECTION SUBCUTANEOUS at 11:17

## 2022-07-04 LAB
ANION GAP SERPL CALC-SCNC: 9 MMOL/L — SIGNIFICANT CHANGE UP (ref 7–14)
BUN SERPL-MCNC: 13 MG/DL — SIGNIFICANT CHANGE UP (ref 7–23)
CALCIUM SERPL-MCNC: 8.9 MG/DL — SIGNIFICANT CHANGE UP (ref 8.4–10.5)
CHLORIDE SERPL-SCNC: 108 MMOL/L — HIGH (ref 98–107)
CO2 SERPL-SCNC: 21 MMOL/L — LOW (ref 22–31)
CREAT SERPL-MCNC: 0.52 MG/DL — SIGNIFICANT CHANGE UP (ref 0.5–1.3)
EGFR: 114 ML/MIN/1.73M2 — SIGNIFICANT CHANGE UP
GLUCOSE SERPL-MCNC: 110 MG/DL — HIGH (ref 70–99)
HCT VFR BLD CALC: 36.3 % — SIGNIFICANT CHANGE UP (ref 34.5–45)
HGB BLD-MCNC: 10.3 G/DL — LOW (ref 11.5–15.5)
MAGNESIUM SERPL-MCNC: 2 MG/DL — SIGNIFICANT CHANGE UP (ref 1.6–2.6)
MCHC RBC-ENTMCNC: 21.2 PG — LOW (ref 27–34)
MCHC RBC-ENTMCNC: 28.4 GM/DL — LOW (ref 32–36)
MCV RBC AUTO: 74.7 FL — LOW (ref 80–100)
NRBC # BLD: 0 /100 WBCS — SIGNIFICANT CHANGE UP
NRBC # FLD: 0 K/UL — SIGNIFICANT CHANGE UP
PHOSPHATE SERPL-MCNC: 3.3 MG/DL — SIGNIFICANT CHANGE UP (ref 2.5–4.5)
PLATELET # BLD AUTO: 306 K/UL — SIGNIFICANT CHANGE UP (ref 150–400)
POTASSIUM SERPL-MCNC: 4.6 MMOL/L — SIGNIFICANT CHANGE UP (ref 3.5–5.3)
POTASSIUM SERPL-SCNC: 4.6 MMOL/L — SIGNIFICANT CHANGE UP (ref 3.5–5.3)
RBC # BLD: 4.86 M/UL — SIGNIFICANT CHANGE UP (ref 3.8–5.2)
RBC # FLD: 19.8 % — HIGH (ref 10.3–14.5)
SARS-COV-2 RNA SPEC QL NAA+PROBE: SIGNIFICANT CHANGE UP
SODIUM SERPL-SCNC: 138 MMOL/L — SIGNIFICANT CHANGE UP (ref 135–145)
WBC # BLD: 6.81 K/UL — SIGNIFICANT CHANGE UP (ref 3.8–10.5)
WBC # FLD AUTO: 6.81 K/UL — SIGNIFICANT CHANGE UP (ref 3.8–10.5)

## 2022-07-04 PROCEDURE — 99232 SBSQ HOSP IP/OBS MODERATE 35: CPT

## 2022-07-04 RX ADMIN — ENOXAPARIN SODIUM 40 MILLIGRAM(S): 100 INJECTION SUBCUTANEOUS at 11:16

## 2022-07-04 RX ADMIN — GABAPENTIN 400 MILLIGRAM(S): 400 CAPSULE ORAL at 06:18

## 2022-07-04 RX ADMIN — Medication 30 MILLIGRAM(S): at 11:16

## 2022-07-04 RX ADMIN — CYCLOBENZAPRINE HYDROCHLORIDE 10 MILLIGRAM(S): 10 TABLET, FILM COATED ORAL at 06:18

## 2022-07-04 RX ADMIN — CYCLOBENZAPRINE HYDROCHLORIDE 10 MILLIGRAM(S): 10 TABLET, FILM COATED ORAL at 13:59

## 2022-07-04 RX ADMIN — LIDOCAINE 1 PATCH: 4 CREAM TOPICAL at 23:10

## 2022-07-04 RX ADMIN — GABAPENTIN 400 MILLIGRAM(S): 400 CAPSULE ORAL at 13:59

## 2022-07-04 RX ADMIN — Medication 650 MILLIGRAM(S): at 01:00

## 2022-07-04 RX ADMIN — POLYETHYLENE GLYCOL 3350 17 GRAM(S): 17 POWDER, FOR SOLUTION ORAL at 11:17

## 2022-07-04 RX ADMIN — Medication 4 MILLIGRAM(S): at 21:30

## 2022-07-04 RX ADMIN — Medication 4 MILLIGRAM(S): at 06:18

## 2022-07-04 RX ADMIN — Medication 30 MILLIGRAM(S): at 06:18

## 2022-07-04 RX ADMIN — CYCLOBENZAPRINE HYDROCHLORIDE 10 MILLIGRAM(S): 10 TABLET, FILM COATED ORAL at 21:29

## 2022-07-04 RX ADMIN — Medication 30 MILLIGRAM(S): at 00:59

## 2022-07-04 RX ADMIN — Medication 325 MILLIGRAM(S): at 11:17

## 2022-07-04 RX ADMIN — Medication 650 MILLIGRAM(S): at 11:17

## 2022-07-04 RX ADMIN — Medication 650 MILLIGRAM(S): at 06:18

## 2022-07-04 RX ADMIN — SENNA PLUS 2 TABLET(S): 8.6 TABLET ORAL at 21:30

## 2022-07-04 RX ADMIN — GABAPENTIN 400 MILLIGRAM(S): 400 CAPSULE ORAL at 21:30

## 2022-07-04 RX ADMIN — LIDOCAINE 1 PATCH: 4 CREAM TOPICAL at 00:42

## 2022-07-04 RX ADMIN — Medication 4 MILLIGRAM(S): at 13:59

## 2022-07-04 RX ADMIN — Medication 3 MILLIGRAM(S): at 21:30

## 2022-07-04 RX ADMIN — LIDOCAINE 1 PATCH: 4 CREAM TOPICAL at 11:17

## 2022-07-04 NOTE — PROGRESS NOTE ADULT - PROBLEM SELECTOR PROBLEM 2
Difficulty walking

## 2022-07-04 NOTE — PROGRESS NOTE ADULT - PROBLEM SELECTOR PLAN 3
Admitted with hemoglobin of 9.7, unknown baseline. MCV 73; unclear etiology; chronic inflammation, possible thalassemia vs chronic blood loss;   - Iron deficiency   - started on ferrous sulfate daily
Admitted with hemoglobin of 9.7, unknown baseline. MCV 73; unclear etiology; chronic inflammation, possible thalassemia vs chronic blood loss;   - f/u anemia workup, including iron, ferritin, b12, folate, retic
Admitted with hemoglobin of 9.7, unknown baseline. MCV 73; unclear etiology; chronic inflammation, possible thalassemia vs chronic blood loss;   - Iron deficiency   - started on ferrous sulfate daily

## 2022-07-04 NOTE — PROGRESS NOTE ADULT - PROBLEM SELECTOR PROBLEM 1
Back pain without radiculopathy
Lumbar radiculopathy
Back pain without radiculopathy
Back pain without radiculopathy

## 2022-07-04 NOTE — PROGRESS NOTE ADULT - PROBLEM SELECTOR PLAN 4
DVT ppx: lovenox  Diet: regular
DVT ppx: lovenox  Diet: regular    Dispo- Pending pain control. PT re-eval.
DVT ppx: lovenox  Diet: regular
DVT ppx: lovenox  Diet: regular    Dispo- Pending pain control. PT re-eval.
DVT ppx: lovenox  Diet: regular
DVT ppx: lovenox  Diet: regular    Dispo- Pending PT re-eval.
DVT ppx: lovenox  Diet: regular  Code status: full code  Disposition: admit to medicine
DVT ppx: lovenox  Diet: regular

## 2022-07-04 NOTE — PROGRESS NOTE ADULT - SUBJECTIVE AND OBJECTIVE BOX
MOUSTAPHA JHONATHAN  47y/Female  [facility] LT9T 931 B    Patient is a 47y old  Female who presents with a chief complaint of Back pain (28 Jun 2022 12:48)      INTERVAL HPI/OVERNIGHT EVENTS: patient seen and examined at bedside   had episode of chest pain this morning - ekg, trops done this morning, noted negative     REVIEW OF SYSTEMS: negative  Vital Signs Last 24 Hrs  T(C): 36.7 (29 Jun 2022 14:15), Max: 37.1 (28 Jun 2022 17:37)  T(F): 98.1 (29 Jun 2022 14:15), Max: 98.8 (28 Jun 2022 17:37)  HR: 76 (29 Jun 2022 14:15) (62 - 76)  BP: 99/62 (29 Jun 2022 14:15) (90/55 - 108/71)  BP(mean): --  RR: 17 (29 Jun 2022 14:15) (16 - 18)  SpO2: 99% (29 Jun 2022 14:15) (97% - 100%)    PHYSICAL EXAM:   NAD; Normocephalic;   LUNGS - no wheezing  HEART: S1 S2+   ABDOMEN: Soft, Nontender, non distended  EXTREMITIES: no cyanosis; no edema, slr positive   NERVOUS SYSTEM:  Awake and alert; no focal neuro deficits appreciated    LABS:                        10.3   6.43  )-----------( 293      ( 29 Jun 2022 06:07 )             35.2     06-28    138  |  104  |  12  ----------------------------<  92  3.4<L>   |  24  |  0.58    Ca    8.7      28 Jun 2022 07:07  Phos  3.2     06-28  Mg     2.20     06-28          CAPILLARY BLOOD GLUCOSE          Medications:  MEDICATIONS  (STANDING):  enoxaparin Injectable 40 milliGRAM(s) SubCutaneous every 24 hours  ferrous    sulfate 325 milliGRAM(s) Oral daily  gabapentin 100 milliGRAM(s) Oral three times a day  lactated ringers. 1000 milliLiter(s) (75 mL/Hr) IV Continuous <Continuous>  lidocaine   4% Patch 1 Patch Transdermal daily  polyethylene glycol 3350 17 Gram(s) Oral daily  senna 2 Tablet(s) Oral at bedtime    MEDICATIONS  (PRN):  ketorolac   Injectable 30 milliGRAM(s) IV Push every 6 hours PRN Severe Pain (7 - 10)  melatonin 3 milliGRAM(s) Oral at bedtime PRN Insomnia     
MOUSTAPHA RING  47y/Female  [facility] LT9T 931 B    Patient is a 47y old  Female who presents with a chief complaint of Back pain (30 Jun 2022 19:08)     at bedside - translating as per patient preference   INTERVAL HPI/OVERNIGHT EVENTS: no acute overnight events noted.   Patient seen and examined at bedside.   reports pain 8/10 before getting pain medication, reports unable to move due to pain    REVIEW OF SYSTEMS: negative  Vital Signs Last 24 Hrs  T(C): 36.8 (01 Jul 2022 09:32), Max: 36.9 (01 Jul 2022 01:44)  T(F): 98.2 (01 Jul 2022 09:32), Max: 98.4 (01 Jul 2022 01:44)  HR: 69 (01 Jul 2022 09:32) (65 - 73)  BP: 106/57 (01 Jul 2022 09:32) (96/61 - 106/57)  BP(mean): --  RR: 18 (01 Jul 2022 09:32) (16 - 18)  SpO2: 100% (01 Jul 2022 09:32) (99% - 100%)    PHYSICAL EXAM:   NAD; Normocephalic;   LUNGS - no wheezing  HEART: S1 S2+   ABDOMEN: Soft, Nontender, non distended  EXTREMITIES: no cyanosis; no edema  NERVOUS SYSTEM:  Awake and alert; no focal neuro deficits appreciated    LABS:                        11.1   7.75  )-----------( 321      ( 30 Jun 2022 06:23 )             38.3     06-30    138  |  104  |  16  ----------------------------<  99  3.7   |  21<L>  |  0.63    Ca    8.7      30 Jun 2022 06:23  Phos  3.7     06-30  Mg     2.10     06-30          CAPILLARY BLOOD GLUCOSE          Medications:  MEDICATIONS  (STANDING):  cyclobenzaprine 5 milliGRAM(s) Oral three times a day  enoxaparin Injectable 40 milliGRAM(s) SubCutaneous every 24 hours  ferrous    sulfate 325 milliGRAM(s) Oral daily  gabapentin 300 milliGRAM(s) Oral three times a day  lactated ringers. 1000 milliLiter(s) (75 mL/Hr) IV Continuous <Continuous>  lidocaine   4% Patch 1 Patch Transdermal daily  polyethylene glycol 3350 17 Gram(s) Oral daily  senna 2 Tablet(s) Oral at bedtime    MEDICATIONS  (PRN):  ketorolac   Injectable 30 milliGRAM(s) IV Push every 6 hours PRN Severe Pain (7 - 10)  melatonin 3 milliGRAM(s) Oral at bedtime PRN Insomnia     
MOUSTAPHA RING  47y/Female  [facility] LT9T 931 B    Patient is a 47y old  Female who presents with a chief complaint of Back pain (29 Jun 2022 15:40)      INTERVAL HPI/OVERNIGHT EVENTS: no acute overnight events noted.   Patient seen and examined at bedside.   patient reports pain is well controlled on medications     REVIEW OF SYSTEMS: negative  Vital Signs Last 24 Hrs  T(C): 36.8 (30 Jun 2022 14:10), Max: 37.1 (30 Jun 2022 10:45)  T(F): 98.2 (30 Jun 2022 14:10), Max: 98.7 (30 Jun 2022 10:45)  HR: 66 (30 Jun 2022 14:10) (63 - 76)  BP: 104/67 (30 Jun 2022 14:10) (98/56 - 108/63)  BP(mean): --  RR: 18 (30 Jun 2022 14:10) (16 - 18)  SpO2: 100% (30 Jun 2022 14:10) (99% - 100%)    PHYSICAL EXAM:   NAD; Normocephalic;   LUNGS - no wheezing  HEART: S1 S2+   ABDOMEN: Soft, Nontender, non distended  EXTREMITIES: no cyanosis; no edema  NERVOUS SYSTEM:  Awake and alert; no focal neuro deficits appreciated    LABS:                        11.1   7.75  )-----------( 321      ( 30 Jun 2022 06:23 )             38.3     06-30    138  |  104  |  16  ----------------------------<  99  3.7   |  21<L>  |  0.63    Ca    8.7      30 Jun 2022 06:23  Phos  3.7     06-30  Mg     2.10     06-30          CAPILLARY BLOOD GLUCOSE          Medications:  MEDICATIONS  (STANDING):  enoxaparin Injectable 40 milliGRAM(s) SubCutaneous every 24 hours  ferrous    sulfate 325 milliGRAM(s) Oral daily  gabapentin 100 milliGRAM(s) Oral three times a day  lactated ringers. 1000 milliLiter(s) (75 mL/Hr) IV Continuous <Continuous>  lidocaine   4% Patch 1 Patch Transdermal daily  polyethylene glycol 3350 17 Gram(s) Oral daily  senna 2 Tablet(s) Oral at bedtime    MEDICATIONS  (PRN):  ketorolac   Injectable 30 milliGRAM(s) IV Push every 6 hours PRN Severe Pain (7 - 10)  melatonin 3 milliGRAM(s) Oral at bedtime PRN Insomnia     
[facility] Division of Hospital Medicine  Cosme Saldivar) MD Aneesh  Pager [phone]    SUBJECTIVE:  Chief complaint: back pain, leg pain.     [id number]   renzo (fluent in English) also assisted.    Pt seen and evaluated at bedside. Just returned from bathroom. Able to ambulate fine. States she has severe pain in the back raidating down the legs. No improvement. She reports since med changes yesterday, she may be feeling more fatigued. Otherwise, per pt and , very adamant about wanting spine injection as she does not want to leave w/o pain control.      ROS: All systems negative except as noted.      Vital Signs Last 24 Hrs  T(C): 36.4 (03 Jul 2022 09:45), Max: 36.9 (02 Jul 2022 22:17)  T(F): 97.5 (03 Jul 2022 09:45), Max: 98.4 (02 Jul 2022 22:17)  HR: 79 (03 Jul 2022 09:45) (67 - 79)  BP: 99/54 (03 Jul 2022 09:45) (99/54 - 112/54)  BP(mean): --  RR: 18 (03 Jul 2022 09:45) (17 - 18)  SpO2: 100% (03 Jul 2022 09:45) (98% - 100%)    PHYSICAL EXAM:  Gen- In bed, comfortable, NAD  Eyes- EOMI, PERRLA, nonicteric.  EMNT- Fair dentition. MMM. No tonsilar exudates. No posterior pharynx erythema.  Neck- Supple. No masses. No tracheal deviation.  Resp- CTAB, good effort. No r/r/w. No accessory muscle use.  CVS- RRR, S1S2, no g/r/m. No LE edema.  GI- Soft abd, NT, ND, +BSx4. No HSM.  MSK- No C/C. Limited ROM of LE 2' pain. +SLR. No crepitus.  Neuro- CN II-XII intact. Speech fluent/face symmetric. Sensation intact.  Skin- No rashes/ulcers. Warm/moist.  Psych- Appropriate mood/affect.        MEDICATION:  MEDICATIONS  (STANDING):  acetaminophen     Tablet .. 650 milliGRAM(s) Oral every 6 hours  cyclobenzaprine 10 milliGRAM(s) Oral three times a day  enoxaparin Injectable 40 milliGRAM(s) SubCutaneous every 24 hours  ferrous    sulfate 325 milliGRAM(s) Oral daily  gabapentin 400 milliGRAM(s) Oral every 8 hours  ketorolac   Injectable 30 milliGRAM(s) IV Push every 6 hours  lidocaine   4% Patch 1 Patch Transdermal daily  methylPREDNISolone   Oral   methylPREDNISolone 4 milliGRAM(s) Oral before breakfast  methylPREDNISolone 4 milliGRAM(s) Oral after lunch  methylPREDNISolone 4 milliGRAM(s) Oral after dinner  methylPREDNISolone 4 milliGRAM(s) Oral at bedtime  polyethylene glycol 3350 17 Gram(s) Oral daily  senna 2 Tablet(s) Oral at bedtime    MEDICATIONS  (PRN):  bisacodyl Suppository 10 milliGRAM(s) Rectal daily PRN Constipation  melatonin 3 milliGRAM(s) Oral at bedtime PRN Insomnia  traMADol 25 milliGRAM(s) Oral every 4 hours PRN Moderate Pain (4 - 6)  traMADol 50 milliGRAM(s) Oral every 4 hours PRN Severe Pain (7 - 10)        LABORATORY:          No new labs today.                
MOUSTAPHA JHONATHAN  47y/Female  [facility] EDU 29    Patient is a 47y old  Female who presents with a chief complaint of Back pain (27 Jun 2022 06:24)      INTERVAL HPI/OVERNIGHT EVENTS: patient continues to report pain over lower back radiating to her both legs and over neck   pain worse with movement     REVIEW OF SYSTEMS: negative  Vital Signs Last 24 Hrs  T(C): 36.7 (27 Jun 2022 13:13), Max: 37.1 (27 Jun 2022 05:58)  T(F): 98.1 (27 Jun 2022 13:13), Max: 98.7 (27 Jun 2022 05:58)  HR: 65 (27 Jun 2022 13:13) (65 - 76)  BP: 102/63 (27 Jun 2022 13:13) (102/63 - 113/61)  BP(mean): --  RR: 18 (27 Jun 2022 13:13) (18 - 18)  SpO2: 99% (27 Jun 2022 13:13) (99% - 100%)    PHYSICAL EXAM:   NAD; Normocephalic;   LUNGS - no wheezing  HEART: S1 S2+   ABDOMEN: Soft, Nontender, non distended  EXTREMITIES: no cyanosis; no edema  NERVOUS SYSTEM:  Awake and alert; no focal neuro deficits appreciated  SLR positive at 30 on left side and 45 on right side     LABS:                        10.3   6.18  )-----------( 328      ( 27 Jun 2022 07:00 )             34.3     06-27    136  |  108<H>  |  10  ----------------------------<  154<H>  3.8   |  20<L>  |  0.43<L>    Ca    8.6      27 Jun 2022 07:00  Phos  2.7     06-27  Mg     2.10     06-27    TPro  7.0  /  Alb  3.9  /  TBili  0.3  /  DBili  x   /  AST  13  /  ALT  10  /  AlkPhos  69  06-26        CAPILLARY BLOOD GLUCOSE          Medications:  MEDICATIONS  (STANDING):  acetaminophen     Tablet .. 1000 milliGRAM(s) Oral every 12 hours  enoxaparin Injectable 40 milliGRAM(s) SubCutaneous every 24 hours  gabapentin 100 milliGRAM(s) Oral three times a day  lidocaine   4% Patch 1 Patch Transdermal daily  polyethylene glycol 3350 17 Gram(s) Oral daily  senna 2 Tablet(s) Oral at bedtime    MEDICATIONS  (PRN):  melatonin 3 milliGRAM(s) Oral at bedtime PRN Insomnia  morphine  - Injectable 2 milliGRAM(s) IV Push every 4 hours PRN Moderate Pain (4 - 6)  morphine  - Injectable 3 milliGRAM(s) IV Push every 6 hours PRN Severe Pain (7 - 10)     
Medicine Progress Note    Patient is a 47y old  Female who presents with a chief complaint of Back pain (03 Jul 2022 12:50)      SUBJECTIVE / OVERNIGHT EVENTS: clinically better , now pain acceptable       MEDICATIONS  (STANDING):  cyclobenzaprine 10 milliGRAM(s) Oral three times a day  enoxaparin Injectable 40 milliGRAM(s) SubCutaneous every 24 hours  ferrous    sulfate 325 milliGRAM(s) Oral daily  gabapentin 400 milliGRAM(s) Oral every 8 hours  lidocaine   4% Patch 1 Patch Transdermal daily  methylPREDNISolone 4 milliGRAM(s) Oral before breakfast  methylPREDNISolone   Oral   methylPREDNISolone 4 milliGRAM(s) Oral at bedtime  polyethylene glycol 3350 17 Gram(s) Oral daily  senna 2 Tablet(s) Oral at bedtime    MEDICATIONS  (PRN):  bisacodyl Suppository 10 milliGRAM(s) Rectal daily PRN Constipation  melatonin 3 milliGRAM(s) Oral at bedtime PRN Insomnia  traMADol 25 milliGRAM(s) Oral every 4 hours PRN Moderate Pain (4 - 6)  traMADol 50 milliGRAM(s) Oral every 4 hours PRN Severe Pain (7 - 10)    CAPILLARY BLOOD GLUCOSE        I&O's Summary      PHYSICAL EXAM:  Vital Signs Last 24 Hrs  T(C): 36.8 (04 Jul 2022 17:54), Max: 36.8 (04 Jul 2022 13:32)  T(F): 98.3 (04 Jul 2022 17:54), Max: 98.3 (04 Jul 2022 13:32)  HR: 80 (04 Jul 2022 17:54) (62 - 80)  BP: 106/53 (04 Jul 2022 17:54) (100/53 - 106/53)  BP(mean): --  RR: 17 (04 Jul 2022 17:54) (17 - 19)  SpO2: 99% (04 Jul 2022 17:54) (99% - 100%)  CONSTITUTIONAL: NAD,   ENMT: Moist oral mucosa, no pharyngeal injection or exudates; normal dentition  RESPIRATORY: Normal respiratory effort; lungs are clear to auscultation bilaterally  CARDIOVASCULAR: Regular rate and rhythm, normal S1 and S2, no murmur/rub/gallop; No lower extremity edema; Peripheral pulses are 2+ bilaterally  ABDOMEN: Nontender to palpation, normoactive bowel sounds, no rebound/guarding;   PSYCH: A+O to person, place, and time; affect appropriate  NEUROLOGY: CN 2-12 are intact and symmetric; no gross sensory deficits   SKIN: No rashes; no palpable lesions    LABS:                        10.3   6.81  )-----------( 306      ( 04 Jul 2022 05:54 )             36.3     07-04    138  |  108<H>  |  13  ----------------------------<  110<H>  4.6   |  21<L>  |  0.52    Ca    8.9      04 Jul 2022 05:54  Phos  3.3     07-04  Mg     2.00     07-04                COVID-19 PCR: NotDetec (04 Jul 2022 06:30)  COVID-19 PCR: NotDetec (27 Jun 2022 03:26)      RADIOLOGY & ADDITIONAL TESTS:  Imaging from Last 24 Hours:    Electrocardiogram/QTc Interval:    COORDINATION OF CARE:  Care Discussed with Consultants/Other Providers:  
[facility] Division of Hospital Medicine  Cosme Saldivar) MD Aneesh  Pager [phone]    SUBJECTIVE:  Chief complaint: back pain, leg pain.     utilized.  Pt seen and evaluated at bedside this AM. No o/n events. DEnies any SOB/Cp/NV. States she continues to have radicular pain from back to the bialteral legs. No significant relief from current regimen. States she was offered injection to the back to alleviate pain. She was agreeable to it.       ROS: All systems negative except as noted.      Vital Signs Last 24 Hrs  T(C): 36.4 (02 Jul 2022 14:38), Max: 37.1 (01 Jul 2022 21:33)  T(F): 97.6 (02 Jul 2022 14:38), Max: 98.8 (02 Jul 2022 02:14)  HR: 73 (02 Jul 2022 14:38) (67 - 73)  BP: 112/54 (02 Jul 2022 14:38) (98/57 - 112/54)  BP(mean): --  RR: 17 (02 Jul 2022 14:38) (17 - 18)  SpO2: 98% (02 Jul 2022 14:38) (96% - 99%)      PHYSICAL EXAM:  Gen- In bed, comfortable, NAD  Eyes- EOMI, PERRLA, nonicteric.  EMNT- Fair dentition. MMM. No tonsilar exudates. No posterior pharynx erythema.  Neck- Supple. No masses. No tracheal deviation.  Resp- CTAB, good effort. No r/r/w. No accessory muscle use.  CVS- RRR, S1S2, no g/r/m. No LE edema.  GI- Soft abd, NT, ND, +BSx4. No HSM.  MSK- No C/C. Limited ROM of LE 2' pain. +SLR. No crepitus.  Neuro- CN II-XII intact. Speech fluent/face symmetric. Sensation intact.  Skin- No rashes/ulcers. Warm/moist.  Psych- Appropriate mood/affect.        MEDICATION:  MEDICATIONS  (STANDING):  acetaminophen     Tablet .. 650 milliGRAM(s) Oral every 6 hours  cyclobenzaprine 10 milliGRAM(s) Oral three times a day  enoxaparin Injectable 40 milliGRAM(s) SubCutaneous every 24 hours  ferrous    sulfate 325 milliGRAM(s) Oral daily  gabapentin 400 milliGRAM(s) Oral every 8 hours  ketorolac   Injectable 30 milliGRAM(s) IV Push every 6 hours  lidocaine   4% Patch 1 Patch Transdermal daily  methylPREDNISolone   Oral   methylPREDNISolone 4 milliGRAM(s) Oral before breakfast  methylPREDNISolone 4 milliGRAM(s) Oral after lunch  methylPREDNISolone 4 milliGRAM(s) Oral after dinner  methylPREDNISolone 8 milliGRAM(s) Oral at bedtime  polyethylene glycol 3350 17 Gram(s) Oral daily  senna 2 Tablet(s) Oral at bedtime    MEDICATIONS  (PRN):  bisacodyl Suppository 10 milliGRAM(s) Rectal daily PRN Constipation  melatonin 3 milliGRAM(s) Oral at bedtime PRN Insomnia  traMADol 25 milliGRAM(s) Oral every 4 hours PRN Moderate Pain (4 - 6)  traMADol 50 milliGRAM(s) Oral every 4 hours PRN Severe Pain (7 - 10)            LABORATORY:                          11.0   7.11  )-----------( 318      ( 02 Jul 2022 05:37 )             36.8     07-02    135  |  104  |  17  ----------------------------<  133<H>  4.4   |  20<L>  |  0.52    Ca    8.9      02 Jul 2022 05:37  Phos  3.2     07-02  Mg     2.10     07-02                COVID-19 PCR: Rachael (27 Jun 2022 03:26)              
MOUSTAPHA RING  47y/Female  [facility] LT9T 931 B     ID: [id number], Laly Zuniga     Patient is a 47y old  Female who presents with a chief complaint of Back pain (27 Jun 2022 14:59)      INTERVAL HPI/OVERNIGHT EVENTS: patient continues to have pain about 8/10 over her neck and lower back - reports some improvement with medications   unable to participate with PT due to pain   patient reports having episode of tingling over her arms and hands, also have burning sensation in her bilateral legs     REVIEW OF SYSTEMS: negative  Vital Signs Last 24 Hrs  T(C): 36.5 (28 Jun 2022 09:20), Max: 36.7 (27 Jun 2022 13:13)  T(F): 97.7 (28 Jun 2022 09:20), Max: 98.1 (27 Jun 2022 13:13)  HR: 68 (28 Jun 2022 09:20) (56 - 69)  BP: 104/56 (28 Jun 2022 09:20) (78/55 - 110/75)  BP(mean): --  RR: 17 (28 Jun 2022 09:20) (17 - 18)  SpO2: 100% (28 Jun 2022 09:20) (98% - 100%)    PHYSICAL EXAM:   NAD; Normocephalic;   LUNGS - no wheezing  HEART: S1 S2+   ABDOMEN: Soft, Nontender, non distended  EXTREMITIES: no cyanosis; no edema  NERVOUS SYSTEM:  Awake and alert; no focal neuro deficits appreciated, SLR positive b/l around 35    LABS:                        10.2   6.44  )-----------( 296      ( 28 Jun 2022 07:07 )             33.6     06-28    138  |  104  |  12  ----------------------------<  92  3.4<L>   |  24  |  0.58    Ca    8.7      28 Jun 2022 07:07  Phos  3.2     06-28  Mg     2.20     06-28    TPro  7.0  /  Alb  3.9  /  TBili  0.3  /  DBili  x   /  AST  13  /  ALT  10  /  AlkPhos  69  06-26        CAPILLARY BLOOD GLUCOSE          Medications:  MEDICATIONS  (STANDING):  acetaminophen     Tablet .. 1000 milliGRAM(s) Oral every 12 hours  enoxaparin Injectable 40 milliGRAM(s) SubCutaneous every 24 hours  gabapentin 100 milliGRAM(s) Oral three times a day  lactated ringers. 1000 milliLiter(s) (75 mL/Hr) IV Continuous <Continuous>  lidocaine   4% Patch 1 Patch Transdermal daily  polyethylene glycol 3350 17 Gram(s) Oral daily  potassium chloride    Tablet ER 40 milliEquivalent(s) Oral every 4 hours  senna 2 Tablet(s) Oral at bedtime    MEDICATIONS  (PRN):  ketorolac   Injectable 30 milliGRAM(s) IV Push every 6 hours PRN Severe Pain (7 - 10)  melatonin 3 milliGRAM(s) Oral at bedtime PRN Insomnia

## 2022-07-04 NOTE — PROGRESS NOTE ADULT - PROBLEM SELECTOR PLAN 1
- New and worsening for 2 months c/b difficulty walking and maintaining balance;  s/p cortisone injection at [place] about one week ago; r/o discitis; No prior dx of lower back pathology;   - patient reports pain started after she lift garbage bag   - patient also reports neck pain radiating to both arms with episodes of tingling   - SLR positive   - hold abx  - standing Tylenol for pain, lidocaine patch daily  - gabapentin dose increased to 300 tid and flexeril added   - ESR 13, CPR <3  - MR cerival and lumbosacral - Mild multilevel degenerative changes. No spinal canal or neuroforaminal   stenosis. Normal appearance of the conus medullaris and cauda equina. Minimal multilevel degenerative changes. No spinal canal or neuroforaminal stenosis. Normal appearance of the cervical cord.    --> L4-5 Disc bulge with central annular fissure and superimposed shallow central disc protrusion. No spinal canal or neuroforaminal stenosis.  - neuro surgery recs noted   -Spoke w/ pt and  [facility] - adamant about getting spine injection before leaving. Will await pain management call back to discuss. In the interim, pt is ambulatory. Explained that these procedures are typically done on an outpatient basis.  current pain regimen given :  Medrol , Flexeryl, Gabapentin , Toradol, Tramadol - pain is controlled
- New and worsening for 2 months c/b difficulty walking and maintaining balance;  s/p cortisone injection at [place] about one week ago; r/o discitis; No prior dx of lower back pathology;   - patient reports pain started after she lift garbage bag   - patient also reports neck pain radiating to both arms with episodes of tingling   - SLR positive   - hold abx  - standing Tylenol for pain, lidocaine patch daily  - started on gabapentin   - BP on lower side last night - holding morphine er  - ESR 13, CPR <3  - MR cerival and lumbosacral - Mild multilevel degenerative changes. No spinal canal or neuroforaminal   stenosis. Normal appearance of the conus medullaris and cauda equina. Minimal multilevel degenerative changes. No spinal canal or neuroforaminal stenosis. Normal appearance of the cervical cord.    --> L4-5 Disc bulge with central annular fissure and superimposed shallow central disc protrusion. No spinal canal or neuroforaminal stenosis.  - will discuss case with orthospine if they can take 2nd look at images and possibility of this bulge causing pain   - if not pain management consult
New and worsening for 2 months c/b difficulty walking and maintaining balance;  s/p cortisone injection at [place] about one week ago; r/o discitis; No prior dx of lower back pathology;   - SLR positive   - hold abx  - standing Tylenol for pain, lidocaine patch daily  - start gabapentin   - IV morphine 2mg IV PRN for moderate and 3mg IVP severe pain  - ordered ESR, CRP  - Lumbosacral MRI w/wo ordered to r/o disk herniation, spinal stenosis, discitis
- New and worsening for 2 months c/b difficulty walking and maintaining balance;  s/p cortisone injection at [place] about one week ago; r/o discitis; No prior dx of lower back pathology;   - patient reports pain started after she lift garbage bag   - patient also reports neck pain radiating to both arms with episodes of tingling   - SLR positive   - hold abx  - standing Tylenol for pain, lidocaine patch daily  - gabapentin dose increased to 300 tid and flexeril added   - ESR 13, CPR <3  - MR cerival and lumbosacral - Mild multilevel degenerative changes. No spinal canal or neuroforaminal   stenosis. Normal appearance of the conus medullaris and cauda equina. Minimal multilevel degenerative changes. No spinal canal or neuroforaminal stenosis. Normal appearance of the cervical cord.    --> L4-5 Disc bulge with central annular fissure and superimposed shallow central disc protrusion. No spinal canal or neuroforaminal stenosis.  - neuro surgery recs noted   - will consult [facility] management
- New and worsening for 2 months c/b difficulty walking and maintaining balance;  s/p cortisone injection at [place] about one week ago; r/o discitis; No prior dx of lower back pathology;   - patient reports pain started after she lift garbage bag   - patient also reports neck pain radiating to both arms with episodes of tingling   - SLR positive   - hold abx  - standing Tylenol for pain, lidocaine patch daily  - gabapentin dose increased to 300 tid and flexeril added   - ESR 13, CPR <3  - MR cerival and lumbosacral - Mild multilevel degenerative changes. No spinal canal or neuroforaminal   stenosis. Normal appearance of the conus medullaris and cauda equina. Minimal multilevel degenerative changes. No spinal canal or neuroforaminal stenosis. Normal appearance of the cervical cord.    --> L4-5 Disc bulge with central annular fissure and superimposed shallow central disc protrusion. No spinal canal or neuroforaminal stenosis.  - neuro surgery recs noted   -As pt re: who offered back injection. She was unable to provide much info. Stated she was seen earlier. Review pain management note - med changes noted. Further OP follow up recommended. We will have to re-address this w/ patient.   -Pain management med recommendations noted.
- New and worsening for 2 months c/b difficulty walking and maintaining balance;  s/p cortisone injection at [place] about one week ago; r/o discitis; No prior dx of lower back pathology;   - patient reports pain started after she lift garbage bag   - patient also reports neck pain radiating to both arms with episodes of tingling   - SLR positive   - hold abx  - standing Tylenol for pain, lidocaine patch daily  - gabapentin dose increased to 300 tid and flexeril added   - ESR 13, CPR <3  - MR cerival and lumbosacral - Mild multilevel degenerative changes. No spinal canal or neuroforaminal   stenosis. Normal appearance of the conus medullaris and cauda equina. Minimal multilevel degenerative changes. No spinal canal or neuroforaminal stenosis. Normal appearance of the cervical cord.    --> L4-5 Disc bulge with central annular fissure and superimposed shallow central disc protrusion. No spinal canal or neuroforaminal stenosis.  - neuro surgery recs noted   -Spoke w/ pt and  S[facility] - adamant about getting spine injection before leaving. Will await pain management call back to discuss. In the interim, pt is ambulatory. Explained that these procedures are typically done on an outpatient basis.
- New and worsening for 2 months c/b difficulty walking and maintaining balance;  s/p cortisone injection at [place] about one week ago; r/o discitis; No prior dx of lower back pathology;   - patient reports pain started after she lift garbage bag   - patient also reports neck pain radiating to both arms with episodes of tingling   - SLR positive   - hold abx  - standing Tylenol for pain, lidocaine patch daily  - started on gabapentin   - BP on lower side last night - holding morphine er  - ESR 13, CPR <3  - follow MR cervical and lumbosacral w/wo contrast    --> patient reported chest pain this morning, cardiac work up negative, likely radicular pain radiating to chest
- New and worsening for 2 months c/b difficulty walking and maintaining balance;  s/p cortisone injection at [place] about one week ago; r/o discitis; No prior dx of lower back pathology;   - patient reports pain started after she lift garbage bag   - patient also reports neck pain radiating to both arms with episodes of tingling   - SLR positive   - hold abx  - standing Tylenol for pain, lidocaine patch daily  - started on gabapentin   - BP on lower side last night - holding morphine er  - ESR 13, CPR <3  - follow MR cervical and lumbosacral w/wo contrast

## 2022-07-04 NOTE — PROGRESS NOTE ADULT - PROBLEM SELECTOR PLAN 2
Due to severe lower back pain;  -plan as above  -fall precautions  -PT evaluation
Due to severe lower back pain;  -plan as above  -fall precautions  -PT evaluation
Due to severe lower back pain;  -plan as above  -fall precautions  - PT follow up
Due to severe lower back pain;  -plan as above  -fall precautions  -PT evaluation
Due to severe lower back pain;  -plan as above  -fall precautions  - PT follow up
Due to severe lower back pain;  -plan as above  -fall precautions  -PT evaluation

## 2022-07-04 NOTE — PROGRESS NOTE ADULT - ASSESSMENT
48 y/o female with no significant MHx, recent ablation for varicose veins a/w severe lower back pain with difficulty walking due to likely bilateral lumbar radiculopathy r/o discitis. Seen by neurosurg/chronic pain management. Rec med management. Ongoing med titration, but w/ minimal relief. 
48 y/o female with no significant MHx, recent ablation for varicose veins a/w severe lower back pain with difficulty walking due to likely bilateral lumbar radiculopathy r/o discitis;
48 y/o female with no significant MHx, recent ablation for varicose veins a/w severe lower back pain with difficulty walking due to likely bilateral lumbar radiculopathy r/o discitis. Seen by neurosurg/chronic pain management. Rec med management. Ongoing med titration, now w/ relief. 
46 y/o female with no significant MHx, recent ablation for varicose veins a/w severe lower back pain with difficulty walking due to likely bilateral lumbar radiculopathy r/o discitis;
48 y/o female with no significant MHx, recent ablation for varicose veins a/w severe lower back pain with difficulty walking due to likely bilateral lumbar radiculopathy r/o discitis;
46 y/o female with no significant MHx, recent ablation for varicose veins a/w severe lower back pain with difficulty walking due to likely bilateral lumbar radiculopathy r/o discitis;

## 2022-07-04 NOTE — PROGRESS NOTE ADULT - PROBLEM SELECTOR PROBLEM 3
Microcytic anemia

## 2022-07-05 ENCOUNTER — TRANSCRIPTION ENCOUNTER (OUTPATIENT)
Age: 47
End: 2022-07-05

## 2022-07-05 VITALS
DIASTOLIC BLOOD PRESSURE: 58 MMHG | TEMPERATURE: 98 F | OXYGEN SATURATION: 98 % | HEART RATE: 73 BPM | SYSTOLIC BLOOD PRESSURE: 112 MMHG | RESPIRATION RATE: 18 BRPM

## 2022-07-05 LAB
ANION GAP SERPL CALC-SCNC: 10 MMOL/L — SIGNIFICANT CHANGE UP (ref 7–14)
BUN SERPL-MCNC: 20 MG/DL — SIGNIFICANT CHANGE UP (ref 7–23)
CALCIUM SERPL-MCNC: 9 MG/DL — SIGNIFICANT CHANGE UP (ref 8.4–10.5)
CHLORIDE SERPL-SCNC: 107 MMOL/L — SIGNIFICANT CHANGE UP (ref 98–107)
CO2 SERPL-SCNC: 22 MMOL/L — SIGNIFICANT CHANGE UP (ref 22–31)
CREAT SERPL-MCNC: 0.67 MG/DL — SIGNIFICANT CHANGE UP (ref 0.5–1.3)
EGFR: 107 ML/MIN/1.73M2 — SIGNIFICANT CHANGE UP
GLUCOSE SERPL-MCNC: 108 MG/DL — HIGH (ref 70–99)
HCT VFR BLD CALC: 33.1 % — LOW (ref 34.5–45)
HGB BLD-MCNC: 9.9 G/DL — LOW (ref 11.5–15.5)
MAGNESIUM SERPL-MCNC: 2 MG/DL — SIGNIFICANT CHANGE UP (ref 1.6–2.6)
MCHC RBC-ENTMCNC: 21.5 PG — LOW (ref 27–34)
MCHC RBC-ENTMCNC: 29.9 GM/DL — LOW (ref 32–36)
MCV RBC AUTO: 71.8 FL — LOW (ref 80–100)
NRBC # BLD: 0 /100 WBCS — SIGNIFICANT CHANGE UP
NRBC # FLD: 0 K/UL — SIGNIFICANT CHANGE UP
PHOSPHATE SERPL-MCNC: 4.2 MG/DL — SIGNIFICANT CHANGE UP (ref 2.5–4.5)
PLATELET # BLD AUTO: 302 K/UL — SIGNIFICANT CHANGE UP (ref 150–400)
POTASSIUM SERPL-MCNC: 4.3 MMOL/L — SIGNIFICANT CHANGE UP (ref 3.5–5.3)
POTASSIUM SERPL-SCNC: 4.3 MMOL/L — SIGNIFICANT CHANGE UP (ref 3.5–5.3)
RBC # BLD: 4.61 M/UL — SIGNIFICANT CHANGE UP (ref 3.8–5.2)
RBC # FLD: 20.1 % — HIGH (ref 10.3–14.5)
SODIUM SERPL-SCNC: 139 MMOL/L — SIGNIFICANT CHANGE UP (ref 135–145)
WBC # BLD: 9.17 K/UL — SIGNIFICANT CHANGE UP (ref 3.8–10.5)
WBC # FLD AUTO: 9.17 K/UL — SIGNIFICANT CHANGE UP (ref 3.8–10.5)

## 2022-07-05 PROCEDURE — 99239 HOSP IP/OBS DSCHRG MGMT >30: CPT

## 2022-07-05 RX ORDER — GABAPENTIN 400 MG/1
1 CAPSULE ORAL
Qty: 15 | Refills: 0
Start: 2022-07-05 | End: 2022-07-09

## 2022-07-05 RX ORDER — TRAMADOL HYDROCHLORIDE 50 MG/1
1 TABLET ORAL
Qty: 30 | Refills: 0
Start: 2022-07-05 | End: 2022-07-09

## 2022-07-05 RX ORDER — CYCLOBENZAPRINE HYDROCHLORIDE 10 MG/1
1 TABLET, FILM COATED ORAL
Qty: 0 | Refills: 0 | DISCHARGE

## 2022-07-05 RX ORDER — TRAMADOL HYDROCHLORIDE 50 MG/1
1 TABLET ORAL
Qty: 25 | Refills: 0
Start: 2022-07-05 | End: 2022-07-09

## 2022-07-05 RX ORDER — FERROUS SULFATE 325(65) MG
1 TABLET ORAL
Qty: 0 | Refills: 0 | DISCHARGE
Start: 2022-07-05

## 2022-07-05 RX ORDER — CYCLOBENZAPRINE HYDROCHLORIDE 10 MG/1
1 TABLET, FILM COATED ORAL
Qty: 15 | Refills: 0
Start: 2022-07-05 | End: 2022-07-09

## 2022-07-05 RX ADMIN — POLYETHYLENE GLYCOL 3350 17 GRAM(S): 17 POWDER, FOR SOLUTION ORAL at 12:33

## 2022-07-05 RX ADMIN — Medication 325 MILLIGRAM(S): at 12:34

## 2022-07-05 RX ADMIN — Medication 4 MILLIGRAM(S): at 06:33

## 2022-07-05 RX ADMIN — GABAPENTIN 400 MILLIGRAM(S): 400 CAPSULE ORAL at 05:26

## 2022-07-05 RX ADMIN — TRAMADOL HYDROCHLORIDE 50 MILLIGRAM(S): 50 TABLET ORAL at 07:06

## 2022-07-05 RX ADMIN — ENOXAPARIN SODIUM 40 MILLIGRAM(S): 100 INJECTION SUBCUTANEOUS at 12:33

## 2022-07-05 RX ADMIN — LIDOCAINE 1 PATCH: 4 CREAM TOPICAL at 12:33

## 2022-07-05 RX ADMIN — TRAMADOL HYDROCHLORIDE 50 MILLIGRAM(S): 50 TABLET ORAL at 11:50

## 2022-07-05 RX ADMIN — TRAMADOL HYDROCHLORIDE 50 MILLIGRAM(S): 50 TABLET ORAL at 06:38

## 2022-07-05 RX ADMIN — TRAMADOL HYDROCHLORIDE 50 MILLIGRAM(S): 50 TABLET ORAL at 10:52

## 2022-07-05 RX ADMIN — CYCLOBENZAPRINE HYDROCHLORIDE 10 MILLIGRAM(S): 10 TABLET, FILM COATED ORAL at 05:26

## 2022-07-05 NOTE — DIETITIAN INITIAL EVALUATION ADULT - OTHER INFO
Pt has no significant medical history. she presented with severe lower back pain with difficulty walking.  Pt had no complaints of nausea, vomiting or diarrhea. She is experiencing constipation but is on a bowel regimen. Pt has no difficulty chewing or swallowing or swallowing. She has no known food allergies.   Pt states that her back pain is better controlled and she is able to consume about 75% of her meals.

## 2022-07-05 NOTE — CHART NOTE - NSCHARTNOTEFT_GEN_A_CORE
Patient seen and examined. Chart and labs reviewed. WILBER Schmitt at bedside.    Dr Murdock provided translation in Welsh. Patient declined phone .    Patient states pain is improved today relative to when she was admitted. States she was able to walk to the bathroom this AM without assistance.    Patient states she is amenable to following w/pain management as outpatient.    All questions answered.    Stable for dc home.    I spent 35 minutes counseling this patient and coordinating their discharge.

## 2022-07-05 NOTE — DISCHARGE NOTE PROVIDER - NSDCCPCAREPLAN_GEN_ALL_CORE_FT
PRINCIPAL DISCHARGE DIAGNOSIS  Diagnosis: Back pain  Assessment and Plan of Treatment: You had an MRI of your back in the hospital which showed disc bulging. This means that the padding between the bones in your spine is slipping out of place and causing pain. You will have physical therapy to help you get stronger. Please continue to take your pain medicine as directed. Please follow-up with a pain management doctor and your primary care doctor, Dr. Weston, within 1-2 weeks of discharge.      SECONDARY DISCHARGE DIAGNOSES  Diagnosis: Microcytic anemia  Assessment and Plan of Treatment: Follow-up with your outpatient provider for further care/recommendations. Monitor for signs/symptoms indicating worsening of disease, such as, easy bleeding/bruising, pale skin, fatigue, dizziness, increased heart rate, or chest pain.     PRINCIPAL DISCHARGE DIAGNOSIS  Diagnosis: Back pain  Assessment and Plan of Treatment: You had an MRI of your back in the hospital which showed disc bulging. This means that the padding between the bones in your spine is slipping out of place and causing pain. You will have physical therapy to help you get stronger. Please take two methylprednisolone tabs tonight and two tabs tomorrow morning. Plesae continue to take your other pain medication as directed. Please follow-up with a pain management doctor and your primary care doctor, Dr. Gordon, within 1-2 weeks of discharge.      SECONDARY DISCHARGE DIAGNOSES  Diagnosis: Microcytic anemia  Assessment and Plan of Treatment: Please continue to take iron supplements daily.  Monitor for signs/symptoms indicating worsening of disease, such as, easy bleeding/bruising, pale skin, fatigue, dizziness, increased heart rate, or chest pain. Follow-up with your primary care provider for further care/recommendations.     PRINCIPAL DISCHARGE DIAGNOSIS  Diagnosis: Back pain  Assessment and Plan of Treatment: You had an MRI of your back in the hospital which showed disc bulging. This means that the padding between the bones in your spine is slipping out of place and causing pain. You will have physical therapy to help you get stronger. Please take one methylprednisolone tab tonight and one tab tomorrow morning. Plesae continue to take your other pain medication as directed. Please follow-up with a pain management doctor and your primary care doctor, Dr. Gordon, within 1-2 weeks of discharge.      SECONDARY DISCHARGE DIAGNOSES  Diagnosis: Microcytic anemia  Assessment and Plan of Treatment: Please continue to take iron supplements daily.  Monitor for signs/symptoms indicating worsening of disease, such as, easy bleeding/bruising, pale skin, fatigue, dizziness, increased heart rate, or chest pain. Follow-up with your primary care provider for further care/recommendations.

## 2022-07-05 NOTE — DISCHARGE NOTE PROVIDER - NSDCMRMEDTOKEN_GEN_ALL_CORE_FT
cyclobenzaprine 5 mg oral tablet: 1 tab(s) orally 3 times a day  naproxen 500 mg oral tablet: 1 tab(s) orally 2 times a day   ferrous sulfate 325 mg (65 mg elemental iron) oral tablet: 1 tab(s) orally once a day   cyclobenzaprine 10 mg oral tablet: 1 tab(s) orally 3 times a day  ferrous sulfate 325 mg (65 mg elemental iron) oral tablet: 1 tab(s) orally once a day  gabapentin 400 mg oral capsule: 1 cap(s) orally every 8 hours  methylPREDNISolone 2 mg oral tablet: 2 tab(s) orally once a day. Please take two tabs tonight on 07/05 and two tomorow morning on 07/06.  physical therapy : Physical therapy three times a week  traMADol 50 mg oral tablet: 1 tab(s) orally every 4 hours, As needed, Severe Pain (7 - 10) MDD:400mg   cyclobenzaprine 10 mg oral tablet: 1 tab(s) orally 3 times a day  ferrous sulfate 325 mg (65 mg elemental iron) oral tablet: 1 tab(s) orally once a day  gabapentin 400 mg oral capsule: 1 cap(s) orally every 8 hours  methylPREDNISolone 2 mg oral tablet: 2 tab(s) orally once a day. Please take two tabs tonight on 07/05 and two tomorow morning on 07/06.  physical therapy : Physical therapy three times a week  traMADol 50 mg oral tablet: 1 tab(s) orally every 4 hours, As needed, Severe Pain (7 - 10) MDD:6 tablets a day   cyclobenzaprine 10 mg oral tablet: 1 tab(s) orally 3 times a day  ferrous sulfate 325 mg (65 mg elemental iron) oral tablet: 1 tab(s) orally once a day  gabapentin 400 mg oral capsule: 1 cap(s) orally every 8 hours  methylPREDNISolone 4 mg oral tablet: 1 tab(s) orally once a day. Please take one tab tonight and one tab tomorrow morning.  physical therapy : Physical therapy three times a week  traMADol 50 mg oral tablet: 1 tab(s) orally every 4 hours, As needed, Severe Pain (7 - 10) MDD:6 tablets a day

## 2022-07-05 NOTE — DISCHARGE NOTE NURSING/CASE MANAGEMENT/SOCIAL WORK - PATIENT PORTAL LINK FT
You can access the FollowMyHealth Patient Portal offered by [facility] by registering at the following website: [email or web address]. By joining [employer]’s FollowMyHealth portal, you will also be able to view your health information using other applications (apps) compatible with our system.

## 2022-07-05 NOTE — DISCHARGE NOTE NURSING/CASE MANAGEMENT/SOCIAL WORK - NSDCPEFALRISK_GEN_ALL_CORE
For information on Fall & Injury Prevention, visit: [email or web address] OR  [email or web address] OR  https://www.cdc.gov/steadi/patient.html

## 2022-07-05 NOTE — DISCHARGE NOTE PROVIDER - HOSPITAL COURSE
48 y/o female with no significant MHx, recent ablation for varicose veins a/w severe lower back pain with difficulty walking due to likely bilateral lumbar radiculopathy r/o discitis. Seen by neurosurg/chronic pain management. Rec med management. Ongoing med titration, now w/ relief.     Back pain without radiculopathy.   - New and worsening for 2 months c/b difficulty walking and maintaining balance;  s/p cortisone injection at [place] about one week ago; r/o discitis; No prior dx of lower back pathology;   - patient reports pain started after she lifted garbage bag   - patient also reports neck pain radiating to both arms with episodes of tingling   - SLR positive   - hold abx  - standing Tylenol for pain, lidocaine patch daily  - ESR 13, CPR <3  - MR cerival and lumbosacral - Mild multilevel degenerative changes. Minimal multilevel degenerative changes. L4-5 Disc bulge with central annular fissure and superimposed shallow central disc protrusion.  -Medrol , Flexeryl, Gabapentin , Toradol, Tramadol - pain is controlled.    Difficulty walking.   -Due to severe lower back pain;  -plan as above  -fall precautions  - outpatient PT follow up.    Microcytic anemia.   -Admitted with hemoglobin of 9.7, unknown baseline. MCV 73; unclear etiology; chronic inflammation, possible thalassemia vs chronic blood loss;   -Iron deficiency   -started on ferrous sulfate daily.    Patient is medically cleared for discharge on 07/05/22. Case discussed with Dr. Murdock. 46 y/o woman with no significant MHx, recent ablation for varicose veins a/w severe lower back pain with difficulty walking due to likely bilateral lumbar radiculopathy r/o discitis. Seen by neurosurg/chronic pain management. Rec med management. Ongoing med titration, now w/ relief.     Back pain without radiculopathy.   - New and worsening for 2 months c/b difficulty walking and maintaining balance;  s/p cortisone injection at [place] about one week ago; r/o discitis; No prior dx of lower back pathology;   - patient reports pain started after she lifted garbage bag   - patient also reports neck pain radiating to both arms with episodes of tingling   - SLR positive   - hold abx  - standing Tylenol for pain, lidocaine patch daily  - ESR 13, CPR <3  - MR cerival and lumbosacral - Mild multilevel degenerative changes. Minimal multilevel degenerative changes. L4-5 Disc bulge with central annular fissure and superimposed shallow central disc protrusion.  -Medrol , Flexeryl, Gabapentin , Toradol, Tramadol - pain is controlled.    Difficulty walking.   -Due to severe lower back pain;  -plan as above  -fall precautions  - outpatient PT follow up.    Microcytic anemia.   -Admitted with hemoglobin of 9.7, unknown baseline. MCV 73; unclear etiology; chronic inflammation, possible thalassemia vs chronic blood loss;   -Iron deficiency   -started on ferrous sulfate daily.    Patient is medically cleared for discharge on 07/05/22. Case discussed with Dr. Murdock.

## 2022-07-05 NOTE — DISCHARGE NOTE NURSING/CASE MANAGEMENT/SOCIAL WORK - NSDCPNINST_GEN_ALL_CORE
Make a follow up appointment with your primary physician. Follow up with your pain management doctor, and your physical therapist. Take your medications as prescribed.

## 2022-07-05 NOTE — DISCHARGE NOTE PROVIDER - CARE PROVIDER_API CALL
Ba Gordon  Phone: [phone]  Fax: (   )    -  Follow Up Time:     Kim Pitt  PHYSICAL/REHAB MEDICINE  [id number] [address] [place] [address]  Phone: [phone]  Fax: [phone]  Follow Up Time:     Aydin Nunez (DO)  Anesthesiology; Pain Medicine  [address] [place], [address] [address]  Phone: [phone]  Fax: [phone]  Follow Up Time:

## 2022-07-05 NOTE — DIETITIAN INITIAL EVALUATION ADULT - PERTINENT LABORATORY DATA
07-05    139  |  107  |  20  ----------------------------<  108<H>  4.3   |  22  |  0.67    Ca    9.0      05 Jul 2022 05:43  Phos  4.2     07-05  Mg     2.00     07-05

## 2022-07-05 NOTE — DIETITIAN INITIAL EVALUATION ADULT - PERTINENT MEDS FT
MEDICATIONS  (STANDING):  cyclobenzaprine 10 milliGRAM(s) Oral three times a day  enoxaparin Injectable 40 milliGRAM(s) SubCutaneous every 24 hours  ferrous    sulfate 325 milliGRAM(s) Oral daily  gabapentin 400 milliGRAM(s) Oral every 8 hours  lidocaine   4% Patch 1 Patch Transdermal daily  methylPREDNISolone   Oral   methylPREDNISolone 4 milliGRAM(s) Oral before breakfast  methylPREDNISolone 4 milliGRAM(s) Oral at bedtime  polyethylene glycol 3350 17 Gram(s) Oral daily  senna 2 Tablet(s) Oral at bedtime    MEDICATIONS  (PRN):  bisacodyl Suppository 10 milliGRAM(s) Rectal daily PRN Constipation  melatonin 3 milliGRAM(s) Oral at bedtime PRN Insomnia  traMADol 25 milliGRAM(s) Oral every 4 hours PRN Moderate Pain (4 - 6)  traMADol 50 milliGRAM(s) Oral every 4 hours PRN Severe Pain (7 - 10)

## 2022-07-05 NOTE — DISCHARGE NOTE PROVIDER - PROVIDER TOKENS
FREE:[LAST:[Heidi],FIRST:[Ba],PHONE:[phone]],FAX:[(   [phone]    -]],PROVIDER:[TOKEN:[[id number]]],PROVIDER:[TOKEN:[[id number]]]

## 2022-07-05 NOTE — DISCHARGE NOTE PROVIDER - NSDCFUADDAPPT_GEN_ALL_CORE_FT
Please follow-up with your primary care provider, Dr. Gordon, within 1-2 weeks of discharge. Please call for an appointment.     Please follow-up with a pain management doctor within 1-2 weeks of discharge. Please call one of the providers listed for an appointment.

## 2022-07-05 NOTE — DIETITIAN INITIAL EVALUATION ADULT - ORAL INTAKE PTA/DIET HISTORY
Pt states her appetite and po intake were less than her normal since she has been experiencing back pain.

## 2022-07-13 PROBLEM — Z78.9 OTHER SPECIFIED HEALTH STATUS: Chronic | Status: ACTIVE | Noted: 2022-06-27

## 2022-07-19 ENCOUNTER — APPOINTMENT (OUTPATIENT)
Dept: NEUROLOGY | Facility: CLINIC | Age: 47
End: 2022-07-19

## 2022-07-19 VITALS
BODY MASS INDEX: 33.66 KG/M2 | WEIGHT: 190 LBS | SYSTOLIC BLOOD PRESSURE: 113 MMHG | HEIGHT: 63 IN | HEART RATE: 73 BPM | DIASTOLIC BLOOD PRESSURE: 80 MMHG

## 2022-07-19 DIAGNOSIS — G57.93 UNSPECIFIED MONONEUROPATHY OF BILATERAL LOWER LIMBS: ICD-10-CM

## 2022-07-19 PROCEDURE — 99203 OFFICE O/P NEW LOW 30 MIN: CPT

## 2022-07-20 PROBLEM — G57.93 NEUROPATHIC PAIN OF BOTH LEGS: Status: ACTIVE | Noted: 2022-07-20

## 2022-07-20 NOTE — ASSESSMENT
[FreeTextEntry1] : Patient reports her pain has recently began to improve and she was advised to continue the gabapentin at 300 mg 3 times daily every 8 hours as long as she continues to have neuropathic pain.  The pain started after her recent varicose vein surgery and I anticipate that her pain will subside.  If this is not the case her  was advised to contact me.  She will return for reevaluation in 3 months.

## 2022-07-20 NOTE — HISTORY OF PRESENT ILLNESS
[FreeTextEntry1] : 47-year-old woman underwent bilateral lower limb varicose vein "heat ablation" procedure about 6 weeks ago and 1 week later noted a "burning" type pain in both lower limbs.  She went to the [facility] emergency room 4 weeks ago and was admitted.   noted with her pain she had some stuttering speech.  Patient had CT scan of the head and MRI of the cervical and lumbar spine all which showed no evidence of significant abnormality to explain her symptoms.  Imaging studies reviewed in PACS.  Patient reported some benefit from gabapentin prescribed in hospital.

## 2022-07-20 NOTE — PHYSICAL EXAM
[FreeTextEntry1] : A detailed neurologic examination is normal.  There is no weakness or sensory loss.  Tendon reflexes are all active and symmetric and plantar responses are flexor.  She has full range of motion of the lumbosacral spine with no pain with straight leg raising at 90 degrees.  Gait is normal.  She can walk on her toes and heels without difficulty.

## 2022-07-20 NOTE — REASON FOR VISIT
[Post ER] : a post ER visit [Spouse] : spouse [Interpreters_Relationshiptopatient] :  [TWNoteComboBox1] : [place]

## 2022-07-22 ENCOUNTER — EMERGENCY (EMERGENCY)
Facility: HOSPITAL | Age: 47
LOS: 1 days | Discharge: HOME | End: 2022-07-22
Attending: EMERGENCY MEDICINE
Payer: MEDICAID

## 2022-07-22 VITALS
DIASTOLIC BLOOD PRESSURE: 69 MMHG | TEMPERATURE: 98 F | OXYGEN SATURATION: 99 % | SYSTOLIC BLOOD PRESSURE: 105 MMHG | RESPIRATION RATE: 18 BRPM | HEART RATE: 73 BPM

## 2022-07-22 VITALS
SYSTOLIC BLOOD PRESSURE: 115 MMHG | DIASTOLIC BLOOD PRESSURE: 81 MMHG | RESPIRATION RATE: 16 BRPM | HEART RATE: 73 BPM | OXYGEN SATURATION: 99 % | WEIGHT: 190.04 LBS | TEMPERATURE: 99 F | HEIGHT: 63 IN

## 2022-07-22 PROCEDURE — 93923 UPR/LXTR ART STDY 3+ LVLS: CPT

## 2022-07-22 PROCEDURE — 93923 UPR/LXTR ART STDY 3+ LVLS: CPT | Mod: 26

## 2022-07-22 PROCEDURE — 99284 EMERGENCY DEPT VISIT MOD MDM: CPT | Mod: 25

## 2022-07-22 PROCEDURE — 93971 EXTREMITY STUDY: CPT | Mod: 26,RT

## 2022-07-22 PROCEDURE — 93971 EXTREMITY STUDY: CPT

## 2022-07-22 PROCEDURE — 99285 EMERGENCY DEPT VISIT HI MDM: CPT

## 2022-07-22 RX ORDER — ACETAMINOPHEN 500 MG
650 TABLET ORAL ONCE
Refills: 0 | Status: COMPLETED | OUTPATIENT
Start: 2022-07-22 | End: 2022-07-22

## 2022-07-22 RX ADMIN — Medication 650 MILLIGRAM(S): at 15:48

## 2022-07-22 NOTE — ED PROVIDER NOTE - PATIENT PORTAL LINK FT
You can access the FollowMyHealth Patient Portal offered by [facility] by registering at the following website: [email or web address]. By joining [employer]’s FollowMyHealth portal, you will also be able to view your health information using other applications (apps) compatible with our system. You can access the FollowMyHealth Patient Portal offered by [facility] by registering at the following website: [email or web address]. By joining [employer]’s FollowMyHealth portal, you will also be able to view your health information using other applications (apps) compatible with our system. You can access the FollowMyHealth Patient Portal offered by [facility] by registering at the following website: [email or web address]. By joining [employer]’s FollowMyHealth portal, you will also be able to view your health information using other applications (apps) compatible with our system.

## 2022-07-22 NOTE — ED ADULT NURSE NOTE - NSIMPLEMENTINTERV_GEN_ALL_ED
Implemented All Universal Safety Interventions:  [place] to call system. Call bell, personal items and telephone within reach. Instruct patient to call for assistance. Room bathroom lighting operational. Non-slip footwear when patient is off stretcher. Physically safe environment: no spills, clutter or unnecessary equipment. Stretcher in lowest position, wheels locked, appropriate side rails in place. Implemented All Universal Safety Interventions:  [place] to call system. Call bell, personal items and telephone within reach. Instruct patient to call for assistance. Room bathroom lighting operational. Non-slip footwear when patient is off stretcher. Physically safe environment: no spills, clutter or unnecessary equipment. Stretcher in lowest position, wheels locked, appropriate side rails in place. Implemented All Universal Safety Interventions:  [place] to call system. Call bell, personal items and telephone within reach. Instruct patient to call for assistance. Room bathroom lighting operational. Non-slip footwear when patient is off stretcher. Physically safe environment: no spills, clutter or unnecessary equipment. Stretcher in lowest position, wheels locked, appropriate side rails in place.

## 2022-07-22 NOTE — ED PROVIDER NOTE - PHYSICAL EXAMINATION
GENERAL: non-toxic appearing, alert, in NAD  HEENT: atraumatic, normocephalic, Vision grossly intact, no conjunctivitis or discharge, hearing grossly intact,  no nasal discharge, epistaxis   CARDIAC: RRR, normal S1S2,  no appreciable murmurs, no cyanosis, cap refill < 2 seconds  PULM: normal work of breathing, oxygen saturation on RA wnl, CTAB, no crackles, rales, rhonchi, or wheezing  GI: abdomen nondistended, soft, nontender, no guarding or rebound tenderness, no palpable masses  NEURO: awake and alert, follows commands, normal speech, PERRLA, EOMI, no focal motor or sensory deficits  MSK: spine appears normal, no joint swelling or erythema, pain with external rotation at R hip and extension of thigh however ROM full through out, normal ambulation   EXT: no peripheral edema, calf tenderness, redness or swelling  SKIN: warm, dry, and intact, no rashes  PSYCH: appropriate mood and affect

## 2022-07-22 NOTE — ED PROVIDER NOTE - CLINICAL SUMMARY MEDICAL DECISION MAKING FREE TEXT BOX
48yo F with DVT following venous ablation of varicose veins now with 1 month of R thigh pain. Well appearing, no concerning physical exam findings. DDx includes local neuropathic pain, muscle strain, less likely persistent DVT given no LE edema and on eliquis. Will get DVT to reassess for persistent clot. Pt has good outpatient f/u and should be able to be discharged after studies are complete. 46yo F with DVT following venous ablation of varicose veins now with 1 month of R thigh pain. Well appearing, no concerning physical exam findings. DDx includes local neuropathic pain, muscle strain, less likely persistent DVT given no LE edema and on eliquis. Will get DVT to reassess for persistent clot. Pt has good outpatient f/u and should be able to be discharged after studies are complete.

## 2022-07-22 NOTE — ED PROVIDER NOTE - CHILD ABUSE FACILITY
[facility], formerly [facility]

## 2022-07-22 NOTE — ED PROVIDER NOTE - PROGRESS NOTE DETAILS
Keagan Grossman, PGY-3: Pt reexamined at bedside, resting comfortably, vitals stable. MARTHA wnl, U/S duplex with no DVT. Results relayed to pt, advised to continue taking gabapentin and tylenol and to f/u with her specialist. Attending MD Mcdermott.  Pt signed out to me in stable condition pending sono, dc with home gabapentin as currently rxed by outpt provider, 48 yo fem s/p venous ablation of varicose veins with pain in thigh, came to ED for sono.  Duplex non-actionable, pt stable for discharge home.

## 2022-07-22 NOTE — ED PROVIDER NOTE - NSFOLLOWUPINSTRUCTIONS_ED_ALL_ED_FT
Lo vieron por dolor en la brandt fine. Es probable que travis dolor sea causado por enrrique distensión muscular o un dolor nervioso.    - Continúe tomando sarabia gabapentina bonnie se indicó.  - Use almohadillas térmicas en el área para aflojar el músculo  - Continúe tomando 500-1000 mg de tylenol cada 6 horas según sea necesario para el dolor  - Por favor, erma un seguimiento con sarabia médico de atención primaria al recibir el preethi.    Regrese al Departamento de Emergencias si experimenta alguno de los siguientes:  - Dolor torácico, palpitaciones, respiración dolorosa  - Empeoramiento o dificultad persistente para respirar  - Fiebre, pérdida de peso inexplicable, sudores nocturnos  - Prateek en las heces o en la orina  - Nueva debilidad, fatiga o desmayo  - Cualquier nuevo síntoma preocupante    ------------------------------------------------------------------------------------------------------------  You were seen for right leg pain. This pain is likely caused by either a muscle strain or nerve pain.     - Please continue to take your gabapentin as discussed  - Use heating pads to the area to loosen up the muscle  - Continue taking 500-1000mg of tylenol every 6 hours as needed for pain  - Please follow up with your primary care physician upon discharge    Please return to the Emergency Department should you experience any of the following:  - Chest pain, Palpitations, Painful breathing  - Worsening or persistent shortness of breath  - Fever, unexplained weight loss, night sweats  - Blood in stool or in urine  - New weakness, fatigue, or fainting  - Any new concerning symptoms

## 2022-07-22 NOTE — ED ADULT NURSE NOTE - OBJECTIVE STATEMENT
48 y/o female PMH anemia, venous ablation 1 month ago complicated by DVT on eliquis presents to ED from home c/o R thigh pain x 3 weeks. States pain is aching, constant, worse with movement. Recently prescribed gabapentin but has only taken one dose. Denying fever, chills, weakness/swelling.

## 2022-07-22 NOTE — ED PROVIDER NOTE - OBJECTIVE STATEMENT
46yo F pmh anemia, RLE venous ablation one month ago complicated by DVT now on eliquis presenting to [facility] ED with 3 weeks of R thigh pain. Pain is achy, constant, worse with movement, and minimal improvement with Tylenol. recently prescribed gabapentin, has only taken one dose. No fevers/chills, no LE weakness or swelling. 48yo F pmh anemia, RLE venous ablation one month ago complicated by DVT now on eliquis presenting to [facility] ED with 3 weeks of R thigh pain. Pain is achy, constant, worse with movement, and minimal improvement with Tylenol. recently prescribed gabapentin, has only taken one dose. No fevers/chills, no LE weakness or swelling. 46yo F pmh anemia, RLE venous ablation one month ago complicated by DVT now on eliquis presenting to [facility] ED with 3 weeks of R thigh pain. Pain is achy, constant, worse with movement, and minimal improvement with Tylenol. recently prescribed gabapentin, has only taken one dose. No fevers/chills, no LE weakness or swelling.

## 2022-07-27 ENCOUNTER — EMERGENCY (EMERGENCY)
Facility: HOSPITAL | Age: 47
LOS: 1 days | Discharge: HOME | End: 2022-07-27
Attending: EMERGENCY MEDICINE
Payer: MEDICAID

## 2022-07-27 VITALS
SYSTOLIC BLOOD PRESSURE: 122 MMHG | OXYGEN SATURATION: 100 % | RESPIRATION RATE: 15 BRPM | HEART RATE: 66 BPM | DIASTOLIC BLOOD PRESSURE: 70 MMHG | TEMPERATURE: 98 F

## 2022-07-27 VITALS
DIASTOLIC BLOOD PRESSURE: 75 MMHG | HEIGHT: 63 IN | WEIGHT: 197.98 LBS | RESPIRATION RATE: 20 BRPM | TEMPERATURE: 99 F | SYSTOLIC BLOOD PRESSURE: 110 MMHG | HEART RATE: 77 BPM | OXYGEN SATURATION: 98 %

## 2022-07-27 LAB
ALBUMIN SERPL ELPH-MCNC: 4.5 G/DL — SIGNIFICANT CHANGE UP (ref 3.3–5)
ALP SERPL-CCNC: 83 U/L — SIGNIFICANT CHANGE UP (ref 40–120)
ALT FLD-CCNC: 16 U/L — SIGNIFICANT CHANGE UP (ref 10–45)
ANION GAP SERPL CALC-SCNC: 12 MMOL/L — SIGNIFICANT CHANGE UP (ref 5–17)
APPEARANCE UR: CLEAR — SIGNIFICANT CHANGE UP
AST SERPL-CCNC: 22 U/L — SIGNIFICANT CHANGE UP (ref 10–40)
BACTERIA # UR AUTO: NEGATIVE — SIGNIFICANT CHANGE UP
BASE EXCESS BLDV CALC-SCNC: -2.6 MMOL/L — LOW (ref -2–2)
BASOPHILS # BLD AUTO: 0.05 K/UL — SIGNIFICANT CHANGE UP (ref 0–0.2)
BASOPHILS NFR BLD AUTO: 0.8 % — SIGNIFICANT CHANGE UP (ref 0–2)
BILIRUB SERPL-MCNC: 0.2 MG/DL — SIGNIFICANT CHANGE UP (ref 0.2–1.2)
BILIRUB UR-MCNC: NEGATIVE — SIGNIFICANT CHANGE UP
BUN SERPL-MCNC: 13 MG/DL — SIGNIFICANT CHANGE UP (ref 7–23)
CA-I SERPL-SCNC: 1.21 MMOL/L — SIGNIFICANT CHANGE UP (ref 1.15–1.33)
CALCIUM SERPL-MCNC: 9 MG/DL — SIGNIFICANT CHANGE UP (ref 8.4–10.5)
CHLORIDE BLDV-SCNC: 107 MMOL/L — SIGNIFICANT CHANGE UP (ref 96–108)
CHLORIDE SERPL-SCNC: 107 MMOL/L — SIGNIFICANT CHANGE UP (ref 96–108)
CO2 BLDV-SCNC: 24 MMOL/L — SIGNIFICANT CHANGE UP (ref 22–26)
CO2 SERPL-SCNC: 22 MMOL/L — SIGNIFICANT CHANGE UP (ref 22–31)
COLOR SPEC: COLORLESS — SIGNIFICANT CHANGE UP
CREAT SERPL-MCNC: 0.53 MG/DL — SIGNIFICANT CHANGE UP (ref 0.5–1.3)
DIFF PNL FLD: NEGATIVE — SIGNIFICANT CHANGE UP
EGFR: 113 ML/MIN/1.73M2 — SIGNIFICANT CHANGE UP
EOSINOPHIL # BLD AUTO: 0.3 K/UL — SIGNIFICANT CHANGE UP (ref 0–0.5)
EOSINOPHIL NFR BLD AUTO: 5.1 % — SIGNIFICANT CHANGE UP (ref 0–6)
EPI CELLS # UR: 4 /HPF — SIGNIFICANT CHANGE UP
FLUAV AG NPH QL: SIGNIFICANT CHANGE UP
FLUBV AG NPH QL: SIGNIFICANT CHANGE UP
GAS PNL BLDV: 138 MMOL/L — SIGNIFICANT CHANGE UP (ref 136–145)
GAS PNL BLDV: SIGNIFICANT CHANGE UP
GLUCOSE BLDV-MCNC: 91 MG/DL — SIGNIFICANT CHANGE UP (ref 70–99)
GLUCOSE SERPL-MCNC: 98 MG/DL — SIGNIFICANT CHANGE UP (ref 70–99)
GLUCOSE UR QL: NEGATIVE — SIGNIFICANT CHANGE UP
HCG SERPL-ACNC: <2 MIU/ML — SIGNIFICANT CHANGE UP
HCO3 BLDV-SCNC: 23 MMOL/L — SIGNIFICANT CHANGE UP (ref 22–29)
HCT VFR BLD CALC: 37.9 % — SIGNIFICANT CHANGE UP (ref 34.5–45)
HCT VFR BLDA CALC: 37 % — SIGNIFICANT CHANGE UP (ref 34.5–46.5)
HGB BLD CALC-MCNC: 12.3 G/DL — SIGNIFICANT CHANGE UP (ref 11.7–16.1)
HGB BLD-MCNC: 11.5 G/DL — SIGNIFICANT CHANGE UP (ref 11.5–15.5)
HYALINE CASTS # UR AUTO: 0 /LPF — SIGNIFICANT CHANGE UP (ref 0–2)
KETONES UR-MCNC: ABNORMAL
LACTATE BLDV-MCNC: 1.7 MMOL/L — SIGNIFICANT CHANGE UP (ref 0.7–2)
LEUKOCYTE ESTERASE UR-ACNC: NEGATIVE — SIGNIFICANT CHANGE UP
LIDOCAIN IGE QN: 30 U/L — SIGNIFICANT CHANGE UP (ref 7–60)
LYMPHOCYTES # BLD AUTO: 1.65 K/UL — SIGNIFICANT CHANGE UP (ref 1–3.3)
LYMPHOCYTES # BLD AUTO: 28 % — SIGNIFICANT CHANGE UP (ref 13–44)
MAGNESIUM SERPL-MCNC: 2 MG/DL — SIGNIFICANT CHANGE UP (ref 1.6–2.6)
MANUAL SMEAR VERIFICATION: SIGNIFICANT CHANGE UP
MCHC RBC-ENTMCNC: 23.4 PG — LOW (ref 27–34)
MCHC RBC-ENTMCNC: 30.3 GM/DL — LOW (ref 32–36)
MCV RBC AUTO: 77.2 FL — LOW (ref 80–100)
MONOCYTES # BLD AUTO: 0.4 K/UL — SIGNIFICANT CHANGE UP (ref 0–0.9)
MONOCYTES NFR BLD AUTO: 6.8 % — SIGNIFICANT CHANGE UP (ref 2–14)
NEUTROPHILS # BLD AUTO: 3.49 K/UL — SIGNIFICANT CHANGE UP (ref 1.8–7.4)
NEUTROPHILS NFR BLD AUTO: 59.3 % — SIGNIFICANT CHANGE UP (ref 43–77)
NITRITE UR-MCNC: NEGATIVE — SIGNIFICANT CHANGE UP
NT-PROBNP SERPL-SCNC: 94 PG/ML — SIGNIFICANT CHANGE UP (ref 0–300)
PCO2 BLDV: 43 MMHG — HIGH (ref 39–42)
PH BLDV: 7.34 — SIGNIFICANT CHANGE UP (ref 7.32–7.43)
PH UR: 6.5 — SIGNIFICANT CHANGE UP (ref 5–8)
PLAT MORPH BLD: NORMAL — SIGNIFICANT CHANGE UP
PLATELET # BLD AUTO: 275 K/UL — SIGNIFICANT CHANGE UP (ref 150–400)
PO2 BLDV: 36 MMHG — SIGNIFICANT CHANGE UP (ref 25–45)
POTASSIUM BLDV-SCNC: 3.8 MMOL/L — SIGNIFICANT CHANGE UP (ref 3.5–5.1)
POTASSIUM SERPL-MCNC: 3.8 MMOL/L — SIGNIFICANT CHANGE UP (ref 3.5–5.3)
POTASSIUM SERPL-SCNC: 3.8 MMOL/L — SIGNIFICANT CHANGE UP (ref 3.5–5.3)
PROT SERPL-MCNC: 7.6 G/DL — SIGNIFICANT CHANGE UP (ref 6–8.3)
PROT UR-MCNC: NEGATIVE — SIGNIFICANT CHANGE UP
RBC # BLD: 4.91 M/UL — SIGNIFICANT CHANGE UP (ref 3.8–5.2)
RBC # FLD: 25.3 % — HIGH (ref 10.3–14.5)
RBC BLD AUTO: SIGNIFICANT CHANGE UP
RBC CASTS # UR COMP ASSIST: 2 /HPF — SIGNIFICANT CHANGE UP (ref 0–4)
RSV RNA NPH QL NAA+NON-PROBE: SIGNIFICANT CHANGE UP
SAO2 % BLDV: 54 % — LOW (ref 67–88)
SARS-COV-2 RNA SPEC QL NAA+PROBE: SIGNIFICANT CHANGE UP
SODIUM SERPL-SCNC: 141 MMOL/L — SIGNIFICANT CHANGE UP (ref 135–145)
SP GR SPEC: 1.03 — HIGH (ref 1.01–1.02)
TROPONIN T, HIGH SENSITIVITY RESULT: <6 NG/L — SIGNIFICANT CHANGE UP (ref 0–51)
UROBILINOGEN FLD QL: NEGATIVE — SIGNIFICANT CHANGE UP
WBC # BLD: 5.89 K/UL — SIGNIFICANT CHANGE UP (ref 3.8–10.5)
WBC # FLD AUTO: 5.89 K/UL — SIGNIFICANT CHANGE UP (ref 3.8–10.5)
WBC UR QL: 2 /HPF — SIGNIFICANT CHANGE UP (ref 0–5)

## 2022-07-27 PROCEDURE — 84295 ASSAY OF SERUM SODIUM: CPT

## 2022-07-27 PROCEDURE — 82803 BLOOD GASES ANY COMBINATION: CPT

## 2022-07-27 PROCEDURE — 84702 CHORIONIC GONADOTROPIN TEST: CPT

## 2022-07-27 PROCEDURE — 93005 ELECTROCARDIOGRAM TRACING: CPT

## 2022-07-27 PROCEDURE — 71275 CT ANGIOGRAPHY CHEST: CPT | Mod: 26,MA

## 2022-07-27 PROCEDURE — 82947 ASSAY GLUCOSE BLOOD QUANT: CPT

## 2022-07-27 PROCEDURE — 96374 THER/PROPH/DIAG INJ IV PUSH: CPT | Mod: XU

## 2022-07-27 PROCEDURE — 83735 ASSAY OF MAGNESIUM: CPT

## 2022-07-27 PROCEDURE — 85025 COMPLETE CBC W/AUTO DIFF WBC: CPT

## 2022-07-27 PROCEDURE — 81001 URINALYSIS AUTO W/SCOPE: CPT

## 2022-07-27 PROCEDURE — 36415 COLL VENOUS BLD VENIPUNCTURE: CPT

## 2022-07-27 PROCEDURE — 71045 X-RAY EXAM CHEST 1 VIEW: CPT

## 2022-07-27 PROCEDURE — 96375 TX/PRO/DX INJ NEW DRUG ADDON: CPT | Mod: XU

## 2022-07-27 PROCEDURE — 84132 ASSAY OF SERUM POTASSIUM: CPT

## 2022-07-27 PROCEDURE — 85018 HEMOGLOBIN: CPT

## 2022-07-27 PROCEDURE — 82330 ASSAY OF CALCIUM: CPT

## 2022-07-27 PROCEDURE — 87086 URINE CULTURE/COLONY COUNT: CPT

## 2022-07-27 PROCEDURE — 83880 ASSAY OF NATRIURETIC PEPTIDE: CPT

## 2022-07-27 PROCEDURE — 80053 COMPREHEN METABOLIC PANEL: CPT

## 2022-07-27 PROCEDURE — 83690 ASSAY OF LIPASE: CPT

## 2022-07-27 PROCEDURE — 71045 X-RAY EXAM CHEST 1 VIEW: CPT | Mod: 26

## 2022-07-27 PROCEDURE — 83605 ASSAY OF LACTIC ACID: CPT

## 2022-07-27 PROCEDURE — 99285 EMERGENCY DEPT VISIT HI MDM: CPT | Mod: 25

## 2022-07-27 PROCEDURE — 82435 ASSAY OF BLOOD CHLORIDE: CPT

## 2022-07-27 PROCEDURE — 99285 EMERGENCY DEPT VISIT HI MDM: CPT

## 2022-07-27 PROCEDURE — 84484 ASSAY OF TROPONIN QUANT: CPT

## 2022-07-27 PROCEDURE — 85014 HEMATOCRIT: CPT

## 2022-07-27 PROCEDURE — 71275 CT ANGIOGRAPHY CHEST: CPT | Mod: MA

## 2022-07-27 PROCEDURE — 87637 SARSCOV2&INF A&B&RSV AMP PRB: CPT

## 2022-07-27 RX ORDER — ACETAMINOPHEN 500 MG
650 TABLET ORAL ONCE
Refills: 0 | Status: COMPLETED | OUTPATIENT
Start: 2022-07-27 | End: 2022-07-27

## 2022-07-27 RX ORDER — KETOROLAC TROMETHAMINE 30 MG/ML
15 SYRINGE (ML) INJECTION ONCE
Refills: 0 | Status: DISCONTINUED | OUTPATIENT
Start: 2022-07-27 | End: 2022-07-27

## 2022-07-27 RX ORDER — SODIUM CHLORIDE 9 MG/ML
1000 INJECTION INTRAMUSCULAR; INTRAVENOUS; SUBCUTANEOUS ONCE
Refills: 0 | Status: COMPLETED | OUTPATIENT
Start: 2022-07-27 | End: 2022-07-27

## 2022-07-27 RX ORDER — ONDANSETRON 8 MG/1
4 TABLET, FILM COATED ORAL ONCE
Refills: 0 | Status: COMPLETED | OUTPATIENT
Start: 2022-07-27 | End: 2022-07-27

## 2022-07-27 RX ORDER — SODIUM CHLORIDE 9 MG/ML
500 INJECTION INTRAMUSCULAR; INTRAVENOUS; SUBCUTANEOUS ONCE
Refills: 0 | Status: COMPLETED | OUTPATIENT
Start: 2022-07-27 | End: 2022-07-27

## 2022-07-27 RX ADMIN — Medication 650 MILLIGRAM(S): at 12:50

## 2022-07-27 RX ADMIN — SODIUM CHLORIDE 1000 MILLILITER(S): 9 INJECTION INTRAMUSCULAR; INTRAVENOUS; SUBCUTANEOUS at 16:27

## 2022-07-27 RX ADMIN — Medication 650 MILLIGRAM(S): at 19:24

## 2022-07-27 RX ADMIN — Medication 15 MILLIGRAM(S): at 16:26

## 2022-07-27 RX ADMIN — SODIUM CHLORIDE 500 MILLILITER(S): 9 INJECTION INTRAMUSCULAR; INTRAVENOUS; SUBCUTANEOUS at 12:48

## 2022-07-27 RX ADMIN — ONDANSETRON 4 MILLIGRAM(S): 8 TABLET, FILM COATED ORAL at 12:52

## 2022-07-27 NOTE — ED ADULT NURSE NOTE - NSIMPLEMENTINTERV_GEN_ALL_ED
Implemented All Universal Safety Interventions:  [place] to call system. Call bell, personal items and telephone within reach. Instruct patient to call for assistance. Room bathroom lighting operational. Non-slip footwear when patient is off stretcher. Physically safe environment: no spills, clutter or unnecessary equipment. Stretcher in lowest position, wheels locked, appropriate side rails in place. Implemented All Universal Safety Interventions:  [place] to call system. Call bell, personal items and telephone within reach. Instruct patient to call for assistance. Room bathroom lighting operational. Non-slip footwear when patient is off stretcher. Physically safe environment: no spills, clutter or unnecessary equipment. Stretcher in lowest position, wheels locked, appropriate side rails in place. Implemented All Universal Safety Interventions:  [place] to call system. Call bell, personal items and telephone within reach. Instruct patient to call for assistance. Room bathroom lighting operational. Non-slip footwear when patient is off stretcher. Physically safe environment: no spills, clutter or unnecessary equipment. Stretcher in lowest position, wheels locked, appropriate side rails in place.

## 2022-07-27 NOTE — ED PROVIDER NOTE - PHYSICAL EXAMINATION
CONSTITUTIONAL: Patient is awake, alert and oriented x 3. Patient appears in mild distress due to pain;   HEAD: NCAT  EYES: PERRL bilaterally  ENT: Airway patent, Nasal mucosa clear. Mouth with normal mucosa. Throat has no vesicles,  NECK: Supple, No LAD,  LUNGS: CTA B/L, no wheezes, rhonci or rales  HEART: RRR.+S1S2   ABDOMEN: Soft, non-tender to palpation throughout all four quadrants,  MSK: No edema or calf tenderness b/l, FROM upper and lower ext b/l, (+) ttp thoracic spine;   SKIN: No rash or lesions  NEURO: No focal deficits, Strength5/5 UE and LE b/l; Sensation intact

## 2022-07-27 NOTE — ED PROVIDER NOTE - CLINICAL SUMMARY MEDICAL DECISION MAKING FREE TEXT BOX
Alek: 47 year old female with chest pain since 7 pm last night.  left side of chest radiates down left arm.  patient also reports room spining today.  ngetaive dvt study a few days ago at [facility] er.  will get labs, cta r/o pe, cxr, ekg, cardiac enzymes. also reporting dysuria x 2 weeks. will get ua, urine culture, reassess. Alek: 47 year old female with chest pain since 7 pm last night.  left side of chest radiates down left arm.  patient also reports room spining today.  ngetaive dvt study a few days ago at [facility] er.  will get labs, cta r/o pe, cxr, ekg, cardiac enzymes. also reporting dysuria x 2 weeks. will get ua, urine culture, reassess. Alek: 47 year old female with chest pain since 7 pm last night.  left side of chest radiates down left arm.  patient also reports room spining today.  ngetaive dvt study a few days ago at [facility] er.  will get labs, cta r/o pe, cxr, ekg, cardiac enzymes. also reporting dysuria x 2 weeks. will get ua, urine culture, reassess.

## 2022-07-27 NOTE — ED PROVIDER NOTE - NSFOLLOWUPINSTRUCTIONS_ED_ALL_ED_FT
1. Follow up with your PCP within 2-3 days. Bring printed results with you to appointment.   2. Continue all home medications.   3. Drink plenty of fluids. Rest.   4. Take Ibuprofen (i.e. Motrin, Advil) 600mg every 8 hrs for pain as needed. Take with food.   May alternate with Acetminophen (Tylenol) 650mg every 6 hours for pain as needed.  5. Return to the emergency department if you have worsening pain, dizziness, weakness, passing out, fevers, vomiting or any other concerning symptoms.     1. Collette un seguimiento con sarabia PCP dentro de 2-3 días. Lleve los resultados impresos con usted a la juan.   2. Continúe con todos los medicamentos caseros.   3. Samra muchos líquidos. Reposo.   4. [place] ibuprofeno (es decir, Motrin, Advil) 600 mg cada 8 horas para el dolor según sea necesario. Moiz con alimentos.   Puede alternar con acetminofeno (Tylenol) 650 mg cada 6 horas para el dolor según sea necesario.  5. Regrese al departamento de emergencias si tiene dolor que empeora, mareos, debilidad, desmayos, fiebre, vómitos o cualquier otro síntoma preocupante. 1. Follow up with your PCP within 2-3 days. Bring printed results with you to appointment.   2. Continue all home medications.   3. Drink plenty of fluids. Rest.   4. Take Ibuprofen (i.e. Motrin, Advil) 600mg every 8 hrs for pain as needed. Take with food.   May alternate with Acetminophen (Tylenol) 650mg every 6 hours for pain as needed.  5. Return to the emergency department if you have worsening pain, dizziness, weakness, passing out, fevers, vomiting or any other concerning symptoms.     1. Collette un seguimiento con sarabia PCP dentro de 2-3 días. Lleve los resultados impresos con usted a la juan.   2. Continúe con todos los medicamentos caseros.   3. Samra muchos líquidos. Reposo.   4. [place] ibuprofeno (es decir, Motrin, Advil) 600 mg cada 8 horas para el dolor según sea necesario. Moiz con alimentos.   Puede alternar con acetminofeno (Tylenol) 650 mg cada 6 horas para el dolor según sea necesario.  5. Regrese al departamento de emergencias si tiene dolor que empeora, mareos, debilidad, desmayos, fiebre, vómitos o cualquier otro síntoma preocupante. 1. Follow up with your PCP within 2-3 days. Bring printed results with you to appointment.   2. Continue all home medications.   3. Drink plenty of fluids. Rest.   4. Take Ibuprofen (i.e. Motrin, Advil) 600mg every 8 hrs for pain as needed. Take with food.   May alternate with Acetminophen (Tylenol) 650mg every 6 hours for pain as needed.  5. Return to the emergency department if you have worsening pain, dizziness, weakness, passing out, fevers, vomiting or any other concerning symptoms.     1. Collette un seguimiento con sarabia PCP dentro de 2-3 días. Lleve los resultados impresos con usted a la juan.   2. Continúe con todos los medicamentos caseros.   3. Samra muchos líquidos. Reposo.   4. [place] ibuprofeno (es decir, Motrin, Advil) 600 mg cada 8 horas para el dolor según sea necesario. Moiz con alimentos.   Puede alternar con acetminofeno (Tylenol) 650 mg cada 6 horas para el dolor según sea necesario.  5. Regrese al departamento de emergencias si tiene dolor que empeora, mareos, debilidad, desmayos, fiebre, vómitos o cualquier otro síntoma preocupante.

## 2022-07-27 NOTE — ED PROVIDER NOTE - OBJECTIVE STATEMENT
48 y/o female with PMHx of joint pain presents to the ED complaining of chest pain since 7 pm last night. Patient states that she was cooking dinner when the pain began. Pain is to the left side of her chest and radiates down the left arm. Pain also radiates straight through to her back. Pain feels like a "squeezing/stabbing". Pain is constant. Nothing makes pain better or worse. Also feels like her heart is racing. Today patient reports dizziness "like the room is spinning" as well as numbness/tingling to the fingers on the right hand. She also reports headache and sore throat. No recent sick contacts. Few days ago was seen at [facility] due to having leg pain. Patient was negative for a DVT. Patient is Romanian speaking only. She refused  and prefers son to translate. Also complains of vomiting this AM. Has not been eating/drinking today. No recent travel, cardiac history, family history of cardiac disease, abdominal pain, diarrhea, constipation. Patient complains of dysuria for several weeks. She has seen a gyn and had some tests done but does not remember the results. 46 y/o female with PMHx of joint pain presents to the ED complaining of chest pain since 7 pm last night. Patient states that she was cooking dinner when the pain began. Pain is to the left side of her chest and radiates down the left arm. Pain also radiates straight through to her back. Pain feels like a "squeezing/stabbing". Pain is constant. Nothing makes pain better or worse. Also feels like her heart is racing. Today patient reports dizziness "like the room is spinning" as well as numbness/tingling to the fingers on the right hand. She also reports headache and sore throat. No recent sick contacts. Few days ago was seen at [facility] due to having leg pain. Patient was negative for a DVT. Patient is Chadian speaking only. She refused  and prefers son to translate. Also complains of vomiting this AM. Has not been eating/drinking today. No recent travel, cardiac history, family history of cardiac disease, abdominal pain, diarrhea, constipation. Patient complains of dysuria for several weeks. She has seen a gyn and had some tests done but does not remember the results. 46 y/o female with PMHx of joint pain presents to the ED complaining of chest pain since 7 pm last night. Patient states that she was cooking dinner when the pain began. Pain is to the left side of her chest and radiates down the left arm. Pain also radiates straight through to her back. Pain feels like a "squeezing/stabbing". Pain is constant. Nothing makes pain better or worse. Also feels like her heart is racing. Today patient reports dizziness "like the room is spinning" as well as numbness/tingling to the fingers on the right hand. She also reports headache and sore throat. No recent sick contacts. Few days ago was seen at [facility] due to having leg pain. Patient was negative for a DVT. Patient is [place] speaking only. She refused  and prefers son to translate. Also complains of vomiting this AM. Has not been eating/drinking today. No recent travel, cardiac history, family history of cardiac disease, abdominal pain, diarrhea, constipation. Patient complains of dysuria for several weeks. She has seen a gyn and had some tests done but does not remember the results.

## 2022-07-27 NOTE — ED PROVIDER NOTE - PATIENT PORTAL LINK FT
You can access the FollowMyHealth Patient Portal offered by [facility] by registering at the following website: [email or web address]. By joining [employer]’s FollowMyHealth portal, you will also be able to view your health information using other applications (apps) compatible with our system. You can access the FollowMyHealth Patient Portal offered by [facility] by registering at the following website: [email or web address]. By joining [employer]’s FollowMyHealth portal, you will also be able to view your health information using other applications (apps) compatible with our system. You can access the FollowMyHealth Patient Portal offered by [facility] by registering at the following website: [email or web address]. By joining [employer]’s FollowMyHealth portal, you will also be able to view your health information using other applications (apps) compatible with our system.

## 2022-07-27 NOTE — ED PROVIDER NOTE - PROGRESS NOTE DETAILS
Patient reassessed. Pain has improved but not completely subsided. Requesting more pain medication. Discussed lab results with patient and son at bedside. Pending CT chest. Dayana Metzger PA-C CT chest negative for PE. Labs/UA unremarkable. Pain improved s/p meds. Discussed all findings with patient and son at bedside. Patient is stable for d/c. Supportive care discussed. Will follow up outpatient with PCP. Dayana Metzger PA-C

## 2022-07-27 NOTE — ED ADULT NURSE NOTE - OBJECTIVE STATEMENT
Patient is a 46 yo female presenting to ED Patient is a 48 yo female presenting to ED Patient is a 46 yo female presenting ambulatory to ED complaining of chest and back pain. Patient Montenegrin speaking with son present at bedside to help with translation and history. Patient speaking coherently in full sentences. A&Ox4. Patient has been experiencing similar pain for the past few months, however, it has been more severe since yesterday evening at 7pm. Patient describes the pain as sharp, stabbing pain starting in the back radiating to the shoulder and chest. Patient rates pain as 10/10 in severity. PMH includes muscle spasm which she takes Gabapentin for. Patient also complaining of nausea, vomiting and palpitations. Patient denies fever, chills and headache. Heart rate and rhythm regular. Lungs are clear and equal bilaterally. Abdomen soft, nondistended and nontender. Peripheral pulses are strong and equal bilaterally. Upper and lower extremity strength and sensation intact. IV placed. Labs drawn. Patient placed on cardiac montior. Patient is a 46 yo female presenting ambulatory to ED complaining of chest and back pain. Patient [place]an speaking with son present at bedside to help with translation and history. Patient speaking coherently in full sentences. A&Ox4. Patient has been experiencing similar pain for the past few months, however, it has been more severe since yesterday evening at 7pm. Patient describes the pain as sharp, stabbing pain starting in the back radiating to the shoulder and chest. Patient rates pain as 10/10 in severity. PMH includes muscle spasm which she takes Gabapentin for. Patient also complaining of nausea, vomiting and palpitations. Patient denies fever, chills and headache. Heart rate and rhythm regular. Lungs are clear and equal bilaterally. Abdomen soft, nondistended and nontender. Peripheral pulses are strong and equal bilaterally. Upper and lower extremity strength and sensation intact. IV placed. Labs drawn. Patient placed on cardiac montior. Patient is a 46 yo female presenting ambulatory to ED complaining of chest and back pain. Patient Sri Lankan speaking with son present at bedside to help with translation and history. Patient speaking coherently in full sentences. A&Ox4. Patient has been experiencing similar pain for the past few months, however, it has been more severe since yesterday evening at 7pm. Patient describes the pain as sharp, stabbing pain starting in the back radiating to the shoulder and chest. Patient rates pain as 10/10 in severity. PMH includes muscle spasm which she takes Gabapentin for. Patient also complaining of nausea, vomiting and palpitations. Patient denies fever, chills and headache. Heart rate and rhythm regular. Lungs are clear and equal bilaterally. Abdomen soft, nondistended and nontender. Peripheral pulses are strong and equal bilaterally. Upper and lower extremity strength and sensation intact. IV placed. Labs drawn. Patient placed on cardiac montior. Patient is a 46 yo female presenting ambulatory to ED complaining of chest and back pain. Patient Guatemalan speaking with son present at bedside to help with translation and history. Patient speaking coherently in full sentences. A&Ox4. Patient has been experiencing similar pain for the past few months, however, it has been more severe since yesterday evening at 7pm. Patient describes the pain as sharp, stabbing pain starting in the back radiating to the shoulder, chest and arm. Patient rates pain as 10/10 in severity. Patient complaining of dizziness and numbness in the fingers as well. PMH includes muscle spasm which she takes Gabapentin for. Patient also complaining of nausea, vomiting and palpitations. Patient describing Patient denies fever, chills and headache. Heart rate and rhythm regular. Lungs are clear and equal bilaterally. Abdomen soft, nondistended and nontender. Peripheral pulses are strong and equal bilaterally. Upper and lower extremity strength and sensation intact. Pupils equal round and reactive to light. IV placed. Labs drawn. Patient placed on cardiac montior. Safety and comfort measures maintained. Patient is a 48 yo female presenting ambulatory to ED complaining of chest and back pain. Patient Citizen of [place] speaking with son present at bedside to help with translation and history. Patient speaking coherently in full sentences. A&Ox4. Patient has been experiencing similar pain for the past few months, however, it has been more severe since yesterday evening at 7pm. Patient describes the pain as sharp, stabbing pain starting in the back radiating to the shoulder, chest and arm. Patient rates pain as 10/10 in severity. Patient complaining of dizziness and numbness in the fingers as well. PMH includes muscle spasm which she takes Gabapentin for. Patient also complaining of nausea, vomiting and palpitations. Patient describing Patient denies fever, chills and headache. Heart rate and rhythm regular. Lungs are clear and equal bilaterally. Abdomen soft, nondistended and nontender. Peripheral pulses are strong and equal bilaterally. Upper and lower extremity strength and sensation intact. Pupils equal round and reactive to light. IV placed. Labs drawn. Patient placed on cardiac montior. Safety and comfort measures maintained. Patient is a 48 yo female presenting ambulatory to ED complaining of chest and back pain. Patient Paraguayan speaking with son present at bedside to help with translation and history. Patient speaking coherently in full sentences. A&Ox4. Patient has been experiencing similar pain for the past few months, however, it has been more severe since yesterday evening at 7pm. Patient describes the pain as sharp, stabbing pain starting in the back radiating to the shoulder, chest and arm. Patient rates pain as 10/10 in severity. Patient complaining of dizziness and numbness in the fingers as well. PMH includes muscle spasm which she takes Gabapentin for. Patient also complaining of nausea, vomiting and palpitations. Patient describing Patient denies fever, chills and headache. Heart rate and rhythm regular. Lungs are clear and equal bilaterally. Abdomen soft, nondistended and nontender. Peripheral pulses are strong and equal bilaterally. Upper and lower extremity strength and sensation intact. Pupils equal round and reactive to light. IV placed. Labs drawn. Patient placed on cardiac montior. Safety and comfort measures maintained.

## 2022-07-27 NOTE — ED ADULT NURSE REASSESSMENT NOTE - NS ED NURSE REASSESS COMMENT FT1
Report given to Glory CHAMBERS. Patient resting comfortably on stretcher. Safety and comfort measures maintained.

## 2022-07-31 LAB
CULTURE RESULTS: SIGNIFICANT CHANGE UP
SPECIMEN SOURCE: SIGNIFICANT CHANGE UP

## 2022-08-05 ENCOUNTER — EMERGENCY (EMERGENCY)
Facility: HOSPITAL | Age: 47
LOS: 1 days | Discharge: HOME | End: 2022-08-05
Attending: EMERGENCY MEDICINE
Payer: MEDICAID

## 2022-08-05 ENCOUNTER — LABORATORY RESULT (OUTPATIENT)
Age: 47
End: 2022-08-05

## 2022-08-05 ENCOUNTER — OUTPATIENT (OUTPATIENT)
Dept: OUTPATIENT SERVICES | Facility: HOSPITAL | Age: 47
LOS: 1 days | End: 2022-08-05

## 2022-08-05 ENCOUNTER — APPOINTMENT (OUTPATIENT)
Dept: INTERNAL MEDICINE | Facility: CLINIC | Age: 47
End: 2022-08-05

## 2022-08-05 VITALS
DIASTOLIC BLOOD PRESSURE: 80 MMHG | SYSTOLIC BLOOD PRESSURE: 108 MMHG | HEART RATE: 87 BPM | WEIGHT: 192 LBS | HEIGHT: 63 IN | TEMPERATURE: 98.1 F | BODY MASS INDEX: 34.02 KG/M2 | OXYGEN SATURATION: 96 %

## 2022-08-05 VITALS
OXYGEN SATURATION: 96 % | HEART RATE: 98 BPM | WEIGHT: 192.9 LBS | SYSTOLIC BLOOD PRESSURE: 107 MMHG | DIASTOLIC BLOOD PRESSURE: 74 MMHG | RESPIRATION RATE: 20 BRPM | TEMPERATURE: 98 F | HEIGHT: 63 IN

## 2022-08-05 DIAGNOSIS — Z83.3 FAMILY HISTORY OF DIABETES MELLITUS: ICD-10-CM

## 2022-08-05 DIAGNOSIS — Z82.49 FAMILY HISTORY OF ISCHEMIC HEART DISEASE AND OTHER DISEASES OF THE CIRCULATORY SYSTEM: ICD-10-CM

## 2022-08-05 DIAGNOSIS — I83.90 ASYMPTOMATIC VARICOSE VEINS OF UNSPECIFIED LOWER EXTREMITY: Chronic | ICD-10-CM

## 2022-08-05 PROCEDURE — 99204 OFFICE O/P NEW MOD 45 MIN: CPT

## 2022-08-05 PROCEDURE — 99285 EMERGENCY DEPT VISIT HI MDM: CPT | Mod: 25

## 2022-08-05 PROCEDURE — 93010 ELECTROCARDIOGRAM REPORT: CPT

## 2022-08-05 PROCEDURE — 70450 CT HEAD/BRAIN W/O DYE: CPT | Mod: 26,MA

## 2022-08-05 RX ORDER — GABAPENTIN 300 MG/1
300 CAPSULE ORAL 3 TIMES DAILY
Qty: 90 | Refills: 2 | Status: DISCONTINUED | COMMUNITY
Start: 2022-07-22 | End: 2022-08-05

## 2022-08-05 RX ORDER — ACETAMINOPHEN 500 MG
975 TABLET ORAL ONCE
Refills: 0 | Status: COMPLETED | OUTPATIENT
Start: 2022-08-05 | End: 2022-08-05

## 2022-08-05 RX ORDER — METOCLOPRAMIDE HCL 10 MG
10 TABLET ORAL ONCE
Refills: 0 | Status: COMPLETED | OUTPATIENT
Start: 2022-08-05 | End: 2022-08-05

## 2022-08-05 RX ORDER — BLOOD PRESSURE TEST KIT-MEDIUM
KIT MISCELLANEOUS
Qty: 1 | Refills: 0 | Status: ACTIVE | COMMUNITY
Start: 2022-08-05 | End: 1900-01-01

## 2022-08-05 RX ORDER — GABAPENTIN 400 MG/1
400 CAPSULE ORAL
Qty: 270 | Refills: 0 | Status: DISCONTINUED | COMMUNITY
Start: 2022-08-05 | End: 2022-08-05

## 2022-08-05 RX ORDER — ACETAMINOPHEN, ASPIRIN, CAFFEINE 250; 250; 65 MG/1; MG/1; MG/1
250-250-65 TABLET, FILM COATED ORAL
Refills: 0 | Status: DISCONTINUED | COMMUNITY
Start: 2022-08-05 | End: 2022-08-05

## 2022-08-05 RX ORDER — MAGNESIUM SULFATE 500 MG/ML
2 VIAL (ML) INJECTION ONCE
Refills: 0 | Status: COMPLETED | OUTPATIENT
Start: 2022-08-05 | End: 2022-08-05

## 2022-08-05 RX ORDER — MELOXICAM 15 MG/1
15 TABLET ORAL
Refills: 0 | Status: DISCONTINUED | COMMUNITY
Start: 2022-08-05 | End: 2022-08-05

## 2022-08-05 RX ADMIN — Medication 10 MILLIGRAM(S): at 23:50

## 2022-08-05 RX ADMIN — Medication 975 MILLIGRAM(S): at 23:50

## 2022-08-05 RX ADMIN — Medication 25 GRAM(S): at 23:50

## 2022-08-05 NOTE — INTERPRETER SERVICES
[Patient Declined  Services] : - None: Patient declined  services [FreeTextEntry3] : Pt prefers to speak in Kazakh w provider  [TWNoteComboBox1] : Bahamian </= 75% for >/= 1 month

## 2022-08-05 NOTE — ED PROVIDER NOTE - PROGRESS NOTE DETAILS
Jone Knox MD: Work up negative for e/o critical/emergent pathology. Patient symptoms improved while in the ED. VSS compared to arrival. Is at functional baseline and able to tolerate PO food/fluids. Plan = discharge w/ appropriate follow up and outpatient tx for symptom management. Patient happy and agreeable w/ this plan. See discharge instructions for further details. Jone Knox MD: Work up negative for e/o critical/emergent pathology. pt symptoms much improved following tx, particularly after toradol. will d/c with further work up for subacute pathologies being actively managed by her outpatient doctor.

## 2022-08-05 NOTE — ED PROVIDER NOTE - NSFOLLOWUPINSTRUCTIONS_ED_ALL_ED_FT
Stay well hydrated.  Follow-up with your primary doctor within 1-2 days  Bring a copy of your results with you  Return to an ER for worsening symptoms or any other concerns. Stay well hydrated.  Follow-up with your primary doctor within 1-2 days  Bring a copy of your results with you  Return to an ER for worsening symptoms or any other concerns.    Alternate between Tylenol and Ibuprofen. Take 1 g of Tylenol, 4 hours later take 600 mg of Ibuprofen, 4 hours after this take 1 g of Tylenol. Continuing alternating like this as needed for pain. If you do not have pain, you do not need to take this medication.      Use over counter ibuprofen and tylenol for management of your pain. Use only as needed. Read the instructions on the medication container carefully. Follow these instructions when using these medications. These medications can be dangerous when used incorrectly. Possible consequences include liver and kidney disease. These medications are very safe when used correctly.

## 2022-08-05 NOTE — ED PROVIDER NOTE - CLINICAL SUMMARY MEDICAL DECISION MAKING FREE TEXT BOX
48 yo F, no active medical issues, presenting w/ several complaints ongoing for 2 weeks. now w/ similar complaints, most bothersome symptom = headache. given thorough ed and outpatient work up and ongoing evaluation by pcp, low suspicion for acute critical pathology as a cause of presentation, which is essentially unchanged compared to her prior evaluation. will obtain screening hct and tx symptomatically. 46 yo F, no active medical issues, presenting w/ several complaints ongoing for 2 weeks. now w/ similar complaints, most bothersome symptom = headache. given thorough ed and outpatient work up and ongoing evaluation by pcp, low suspicion for acute critical pathology as a cause of presentation, which is essentially unchanged compared to her prior evaluation. will obtain screening hct and tx symptomatically.

## 2022-08-05 NOTE — ED PROVIDER NOTE - OBJECTIVE STATEMENT
48 yo F, no active medical issues, presenting w/ several complaints ongoing for 2 weeks. Pt reports headache, chest pain, bilateral lower abdominal pain, and leg pain. Her most bothersome symptom = headache. She denies remarkable events prior to onset. The pt was recently evaluated in this ED for similar presentation. Work up at that time, including lab work and CT imaging were unremarkable. She denies weakness, numbness, vision changes or syncope. She was seen at her primary care doctors clinic prior to arriving here, review of note showed ongoing assessment for anemia and PVD, pending vascular studies. Pt appears to be confused about this, and states that she was diagnosed w/ clots in her legs. NKDA. No Regular Rx. 46 yo F, no active medical issues, presenting w/ several complaints ongoing for 2 weeks. Pt reports headache, chest pain, bilateral lower abdominal pain, and leg pain. Her most bothersome symptom = headache. She denies remarkable events prior to onset. The pt was recently evaluated in this ED for similar presentation. Work up at that time, including lab work and CT imaging were unremarkable. She denies weakness, numbness, vision changes or syncope. She was seen at her primary care doctors clinic prior to arriving here, review of note showed ongoing assessment for anemia and PVD, pending vascular studies. Pt appears to be confused about this, and states that she was diagnosed w/ clots in her legs. NKDA. No Regular Rx.

## 2022-08-05 NOTE — ED PROVIDER NOTE - NS ED ROS FT
GENERAL: no fever  EYES: no eye pain  HEENT: no neck pain  CARDIAC: + chest pain  PULMONARY: no SOB  GI: + abdominal pain  : no dysuria  SKIN: no rashes  NEURO: + headache  MSK: + bilateral leg pain

## 2022-08-05 NOTE — PLAN
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\par - will obtain US duplex of lower extremity\par - vascular referral provided for assistance\par \par #anemia\par - per pt has hx of anemia\par - has been taking iron in the past\par - will obtain CBC, FIT testing, GI referral as she has never had a colonoscopy, GYN referral\par - will consider obtaining iron studies after CBC\par \par #HA\par - advised pt to cont conservative tx w tylenol, rest, practice mindfulness, adequate hydration\par -advised to not use HA medication frequently as it can cause rebound HA\par - advised to notify if not resolving\par - can also f/u with neurology\par - discussed redflags flags of HA, advised to seek immediate attention if worse HA, focal neurological deficit, non resolving HA, HA with stiff neck/fever, etc. \par \par #?neuropathic pain\par - unclear of pain related to neuropathic process or vascular process\par - advised to cont f/u with neurology\par - hold off gabapentin given side effects of dizziness\par \par #back pain\par - had MRI of spine showing degenerative changes\par - PT referral provided\par - advised cont w conservative tx, heat pad, massage, stretching exercises\par - ortho referral also provided \par \par #HCM\par - labs as above \par - advised to obtain medical records/ vaccination records \par - pt requesting BP monitor- provided \par - GI referral for possible colonoscopy, GYN referral also for possible pap \par -will discuss mammogram and other age appropriate interventions at another time \par \par f/u in 1 mo or sooner if needed

## 2022-08-05 NOTE — ED PROVIDER NOTE - ATTENDING CONTRIBUTION TO CARE
Attending MD Morrison:  I personally have seen and examined this patient. I have performed a substantive portion of the visit including all aspects of the medical decision making.  Resident note reviewed and agree on plan of care and except where noted.      47F presenting with headache, chest pain and b/l lower abd pain. Similar symptom complex for which patient had ED evaluation 2 weeks prior (negative CTA for PE, negative cardiac biomarkers). Non-focal neuro examination. ECG unchanged from priors. Will obtain CT head given persistent HA, however fairly low suspicion for acute CNS pathology. Will repeat labs including biomarkers for chest pain, ACS not suspected.           *The above represents an initial assessment/impression. Please refer to progress notes for potential changes in patient clinical course*

## 2022-08-05 NOTE — ED PROVIDER NOTE - PATIENT PORTAL LINK FT
You can access the FollowMyHealth Patient Portal offered by [facility] by registering at the following website: [email or web address]. By joining [employer]’s FollowMyHealth portal, you will also be able to view your health information using other applications (apps) compatible with our system. You can access the FollowMyHealth Patient Portal offered by [facility] by registering at the following website: [email or web address]. By joining [employer]’s FollowMyHealth portal, you will also be able to view your health information using other applications (apps) compatible with our system. You can access the FollowMyHealth Patient Portal offered by [facility] by registering at the following website: [email or web address]. By joining [employer]’s FollowMyHealth portal, you will also be able to view your health information using other applications (apps) compatible with our system. You can access the FollowMyHealth Patient Portal offered by [facility] by registering at the following website: [email or web address]. By joining [employer]’s FollowMyHealth portal, you will also be able to view your health information using other applications (apps) compatible with our system. You can access the FollowMyHealth Patient Portal offered by [facility] by registering at the following website: [email or web address]. By joining [employer]’s FollowMyHealth portal, you will also be able to view your health information using other applications (apps) compatible with our system. You can access the FollowMyHealth Patient Portal offered by [facility] by registering at the following website: [email or web address]. By joining [employer]’s FollowMyHealth portal, you will also be able to view your health information using other applications (apps) compatible with our system.

## 2022-08-05 NOTE — ED PROVIDER NOTE - PHYSICAL EXAMINATION
Gen: NAD, non-toxic appearing  Head: normal appearing  HEENT: normal conjunctiva  Lung: no respiratory distress, speaking in full sentences, ctab      CV: regular rate and rhythm, no murmurs, palpable x4 extremities  Abd: soft, non distended, non tender   MSK: no visible deformities  Neuro:   Alert and grossly oriented, following commands  CN II-XII intact  5/5 strength in x4 extremities   Intact sensation to light touch in x4 extremities  Finger to Nose testing normal  Skin: No shivam rashes

## 2022-08-05 NOTE — PHYSICAL EXAM
[No Acute Distress] : no acute distress [Normal Oropharynx] : the oropharynx was normal [Supple] : supple [No Respiratory Distress] : no respiratory distress  [No Accessory Muscle Use] : no accessory muscle use [Clear to Auscultation] : lungs were clear to auscultation bilaterally [Normal Rate] : normal rate  [Regular Rhythm] : with a regular rhythm [Normal S1, S2] : normal S1 and S2 [Pedal Pulses Present] : the pedal pulses are present [No Edema] : there was no peripheral edema [No Extremity Clubbing/Cyanosis] : no extremity clubbing/cyanosis [Soft] : abdomen soft [Non Tender] : non-tender [Non-distended] : non-distended [Normal Bowel Sounds] : normal bowel sounds [Grossly Normal Strength/Tone] : grossly normal strength/tone [No Focal Deficits] : no focal deficits [Normal Affect] : the affect was normal [Normal Insight/Judgement] : insight and judgment were intact [de-identified] : warm extremities  [de-identified] : Negative straight leg test

## 2022-08-05 NOTE — HISTORY OF PRESENT ILLNESS
[Spouse] : spouse [FreeTextEntry1] : Patient comes in with multiple concerns  [de-identified] : \par Patient is a 47 year old F with hx of anemia, varicose veins, venous ablation and back pain coming in to establish care. \par \par Pt reports hx of venous ablation--> she initially complained of complained of R leg swelling. Pt saw an outside vascular doctor and was told that her "vein was leaky." She had venous ablation in both legs about 3 months ago. Pt also with recent visit to an outside vascular doctor which diagnosed her with BL  CIV  stenosis and was recommended compression stalking. Pt reports never taking blood thinning medication. Pt notes burning pain in between her inner thighs. She states that she is confused about her diagnosis and is interested about establishing care with [facility] vascular . \par \par Pt also complaining of chronic back pain for several years- This is now worse after her vein surgery. Pain starts at the neck radiating to the back. Pain is constant. Pt has seen an outside ortho once and was recommended PT which she has not done. Pending also f/u with an outside neurology and ?rheumatology. \par \par She also had a recent follow with neurology for neuropathic pain and was started on gabapentin. Her gabapentin dose was also adjusted by outside doctor. Pt did not tolerate gabapentin as this makes her dizzy. Pt notes that Tylenol helps with her pain. \par \par Pt complaining of HA for the last two weeks. Start at the posterior part and radiates forward. Pt notes this is pressure like. Lasts for 3 hours. Happened 5 x a week.  Pt does not have visual changes. Denies focal weakness. Migraine relief helps with HA- has been using 2-3 x a week.  Reports feeling stressed/worried regarding her medical condition. Not sleeping well due to her pain/stress about her medical conditions.  Reports eating smaller amounts.\par \par Pt also reports hx of anemia. Has been taking iron for this concern. Denies any heavy vaginal bleeding or blood in her stool. Denies any N/V/D/C melena or hematochezia. Has never had a colonoscopy.

## 2022-08-06 VITALS
HEART RATE: 80 BPM | OXYGEN SATURATION: 100 % | SYSTOLIC BLOOD PRESSURE: 100 MMHG | RESPIRATION RATE: 19 BRPM | DIASTOLIC BLOOD PRESSURE: 67 MMHG | TEMPERATURE: 98 F

## 2022-08-06 LAB
ALBUMIN SERPL ELPH-MCNC: 4.5 G/DL — SIGNIFICANT CHANGE UP (ref 3.3–5)
ALP SERPL-CCNC: 76 U/L — SIGNIFICANT CHANGE UP (ref 40–120)
ALT FLD-CCNC: 15 U/L — SIGNIFICANT CHANGE UP (ref 10–45)
ANION GAP SERPL CALC-SCNC: 13 MMOL/L — SIGNIFICANT CHANGE UP (ref 5–17)
AST SERPL-CCNC: 14 U/L — SIGNIFICANT CHANGE UP (ref 10–40)
BASOPHILS # BLD AUTO: 0.02 K/UL — SIGNIFICANT CHANGE UP (ref 0–0.2)
BASOPHILS NFR BLD AUTO: 0.3 % — SIGNIFICANT CHANGE UP (ref 0–2)
BILIRUB SERPL-MCNC: 0.4 MG/DL — SIGNIFICANT CHANGE UP (ref 0.2–1.2)
BUN SERPL-MCNC: 11 MG/DL — SIGNIFICANT CHANGE UP (ref 7–23)
CALCIUM SERPL-MCNC: 9.4 MG/DL — SIGNIFICANT CHANGE UP (ref 8.4–10.5)
CHLORIDE SERPL-SCNC: 105 MMOL/L — SIGNIFICANT CHANGE UP (ref 96–108)
CO2 SERPL-SCNC: 22 MMOL/L — SIGNIFICANT CHANGE UP (ref 22–31)
CREAT SERPL-MCNC: 0.62 MG/DL — SIGNIFICANT CHANGE UP (ref 0.5–1.3)
EGFR: 109 ML/MIN/1.73M2 — SIGNIFICANT CHANGE UP
EOSINOPHIL # BLD AUTO: 0.16 K/UL — SIGNIFICANT CHANGE UP (ref 0–0.5)
EOSINOPHIL NFR BLD AUTO: 2.4 % — SIGNIFICANT CHANGE UP (ref 0–6)
GLUCOSE SERPL-MCNC: 99 MG/DL — SIGNIFICANT CHANGE UP (ref 70–99)
HCT VFR BLD CALC: 39.3 % — SIGNIFICANT CHANGE UP (ref 34.5–45)
HGB BLD-MCNC: 12.5 G/DL — SIGNIFICANT CHANGE UP (ref 11.5–15.5)
IMM GRANULOCYTES NFR BLD AUTO: 0.3 % — SIGNIFICANT CHANGE UP (ref 0–1.5)
LYMPHOCYTES # BLD AUTO: 2.64 K/UL — SIGNIFICANT CHANGE UP (ref 1–3.3)
LYMPHOCYTES # BLD AUTO: 39.5 % — SIGNIFICANT CHANGE UP (ref 13–44)
MCHC RBC-ENTMCNC: 25.2 PG — LOW (ref 27–34)
MCHC RBC-ENTMCNC: 31.8 GM/DL — LOW (ref 32–36)
MCV RBC AUTO: 79.2 FL — LOW (ref 80–100)
MONOCYTES # BLD AUTO: 0.54 K/UL — SIGNIFICANT CHANGE UP (ref 0–0.9)
MONOCYTES NFR BLD AUTO: 8.1 % — SIGNIFICANT CHANGE UP (ref 2–14)
NEUTROPHILS # BLD AUTO: 3.3 K/UL — SIGNIFICANT CHANGE UP (ref 1.8–7.4)
NEUTROPHILS NFR BLD AUTO: 49.4 % — SIGNIFICANT CHANGE UP (ref 43–77)
NRBC # BLD: 0 /100 WBCS — SIGNIFICANT CHANGE UP (ref 0–0)
PLATELET # BLD AUTO: 364 K/UL — SIGNIFICANT CHANGE UP (ref 150–400)
POTASSIUM SERPL-MCNC: 3.8 MMOL/L — SIGNIFICANT CHANGE UP (ref 3.5–5.3)
POTASSIUM SERPL-SCNC: 3.8 MMOL/L — SIGNIFICANT CHANGE UP (ref 3.5–5.3)
PROT SERPL-MCNC: 7.3 G/DL — SIGNIFICANT CHANGE UP (ref 6–8.3)
RBC # BLD: 4.96 M/UL — SIGNIFICANT CHANGE UP (ref 3.8–5.2)
RBC # FLD: 24.9 % — HIGH (ref 10.3–14.5)
SODIUM SERPL-SCNC: 140 MMOL/L — SIGNIFICANT CHANGE UP (ref 135–145)
TROPONIN T, HIGH SENSITIVITY RESULT: <6 NG/L — SIGNIFICANT CHANGE UP (ref 0–51)
WBC # BLD: 6.68 K/UL — SIGNIFICANT CHANGE UP (ref 3.8–10.5)
WBC # FLD AUTO: 6.68 K/UL — SIGNIFICANT CHANGE UP (ref 3.8–10.5)

## 2022-08-06 PROCEDURE — 84484 ASSAY OF TROPONIN QUANT: CPT

## 2022-08-06 PROCEDURE — 96374 THER/PROPH/DIAG INJ IV PUSH: CPT

## 2022-08-06 PROCEDURE — 96375 TX/PRO/DX INJ NEW DRUG ADDON: CPT

## 2022-08-06 PROCEDURE — 93005 ELECTROCARDIOGRAM TRACING: CPT

## 2022-08-06 PROCEDURE — 99285 EMERGENCY DEPT VISIT HI MDM: CPT | Mod: 25

## 2022-08-06 PROCEDURE — 70450 CT HEAD/BRAIN W/O DYE: CPT | Mod: MA

## 2022-08-06 PROCEDURE — 85025 COMPLETE CBC W/AUTO DIFF WBC: CPT

## 2022-08-06 PROCEDURE — 80053 COMPREHEN METABOLIC PANEL: CPT

## 2022-08-06 RX ORDER — KETOROLAC TROMETHAMINE 30 MG/ML
15 SYRINGE (ML) INJECTION ONCE
Refills: 0 | Status: DISCONTINUED | OUTPATIENT
Start: 2022-08-06 | End: 2022-08-06

## 2022-08-06 RX ADMIN — Medication 15 MILLIGRAM(S): at 02:49

## 2022-08-06 NOTE — ED ADULT NURSE NOTE - OBJECTIVE STATEMENT
46y/o F coming to the ED c.o CP. Pt states she been experiencing sternal non-radiating CP, HA & nausea x2 week. Pt denies any Fever/chills/V/D. On exam, pt is breathing spontaneously, able to speak full sentences w/o difficulty, saturating 98% RA. heart monitor placed, NSR. Abdomen soft, nontender, nondistended. IV placed, labs collected and sent. VSS. 48y/o F coming to the ED c.o CP. Pt states she been experiencing sternal non-radiating CP, HA & nausea x2 week. Pt denies any Fever/chills/V/D. On exam, pt is breathing spontaneously, able to speak full sentences w/o difficulty, saturating 98% RA. heart monitor placed, NSR. Abdomen soft, nontender, nondistended. IV placed, labs collected and sent. VSS.

## 2022-08-06 NOTE — ED ADULT NURSE NOTE - NSIMPLEMENTINTERV_GEN_ALL_ED
Implemented All Universal Safety Interventions:  [place] to call system. Call bell, personal items and telephone within reach. Instruct patient to call for assistance. Room bathroom lighting operational. Non-slip footwear when patient is off stretcher. Physically safe environment: no spills, clutter or unnecessary equipment. Stretcher in lowest position, wheels locked, appropriate side rails in place. Implemented All Universal Safety Interventions:  [place] to call system. Call bell, personal items and telephone within reach. Instruct patient to call for assistance. Room bathroom lighting operational. Non-slip footwear when patient is off stretcher. Physically safe environment: no spills, clutter or unnecessary equipment. Stretcher in lowest position, wheels locked, appropriate side rails in place. Implemented All Universal Safety Interventions:  [place] to call system. Call bell, personal items and telephone within reach. Instruct patient to call for assistance. Room bathroom lighting operational. Non-slip footwear when patient is off stretcher. Physically safe environment: no spills, clutter or unnecessary equipment. Stretcher in lowest position, wheels locked, appropriate side rails in place.

## 2022-08-08 ENCOUNTER — APPOINTMENT (OUTPATIENT)
Dept: ULTRASOUND IMAGING | Facility: CLINIC | Age: 47
End: 2022-08-08

## 2022-08-08 ENCOUNTER — NON-APPOINTMENT (OUTPATIENT)
Age: 47
End: 2022-08-08

## 2022-08-08 ENCOUNTER — LABORATORY RESULT (OUTPATIENT)
Age: 47
End: 2022-08-08

## 2022-08-08 PROCEDURE — 93970 EXTREMITY STUDY: CPT

## 2022-08-09 ENCOUNTER — NON-APPOINTMENT (OUTPATIENT)
Age: 47
End: 2022-08-09

## 2022-08-09 LAB
ALBUMIN SERPL ELPH-MCNC: 4.9 G/DL
ALP BLD-CCNC: 84 U/L
ALT SERPL-CCNC: 19 U/L
ANION GAP SERPL CALC-SCNC: 14 MMOL/L
AST SERPL-CCNC: 21 U/L
BASOPHILS # BLD AUTO: 0 K/UL
BASOPHILS NFR BLD AUTO: 0 %
BILIRUB SERPL-MCNC: 0.3 MG/DL
BUN SERPL-MCNC: 11 MG/DL
CALCIUM SERPL-MCNC: 9.4 MG/DL
CHLORIDE SERPL-SCNC: 105 MMOL/L
CHOLEST SERPL-MCNC: 240 MG/DL
CO2 SERPL-SCNC: 19 MMOL/L
CREAT SERPL-MCNC: 0.58 MG/DL
EGFR: 111 ML/MIN/1.73M2
EOSINOPHIL # BLD AUTO: 0.13 K/UL
EOSINOPHIL NFR BLD AUTO: 1.8 %
ESTIMATED AVERAGE GLUCOSE: 105 MG/DL
GLUCOSE SERPL-MCNC: 102 MG/DL
HBA1C MFR BLD HPLC: 5.3 %
HCT VFR BLD CALC: 43.4 %
HDLC SERPL-MCNC: 62 MG/DL
HGB BLD-MCNC: 13.5 G/DL
LDLC SERPL CALC-MCNC: 149 MG/DL
LDLC SERPL DIRECT ASSAY-MCNC: 146 MG/DL
LYMPHOCYTES # BLD AUTO: 1.84 K/UL
LYMPHOCYTES NFR BLD AUTO: 25.6 %
MAN DIFF?: NORMAL
MCHC RBC-ENTMCNC: 25.1 PG
MCHC RBC-ENTMCNC: 31.1 GM/DL
MCV RBC AUTO: 80.7 FL
MONOCYTES # BLD AUTO: 0.51 K/UL
MONOCYTES NFR BLD AUTO: 7.1 %
NEUTROPHILS # BLD AUTO: 4.71 K/UL
NEUTROPHILS NFR BLD AUTO: 65.5 %
NONHDLC SERPL-MCNC: 177 MG/DL
PLATELET # BLD AUTO: 425 K/UL
POTASSIUM SERPL-SCNC: 4.1 MMOL/L
PROT SERPL-MCNC: 7.9 G/DL
RBC # BLD: 5.38 M/UL
RBC # FLD: 25.5 %
SODIUM SERPL-SCNC: 138 MMOL/L
TRIGL SERPL-MCNC: 143 MG/DL
WBC # FLD AUTO: 7.19 K/UL

## 2022-08-09 NOTE — ED PROVIDER NOTE - PROGRESS NOTE DETAILS
89
RHETT MATHEW:  xr's show no acute pathology, pain meds, steropids given with no relief, will be admitted for inability to ambulate

## 2022-08-12 ENCOUNTER — NON-APPOINTMENT (OUTPATIENT)
Age: 47
End: 2022-08-12

## 2022-08-15 DIAGNOSIS — I73.9 PERIPHERAL VASCULAR DISEASE, UNSPECIFIED: ICD-10-CM

## 2022-08-15 DIAGNOSIS — R51.9 HEADACHE, UNSPECIFIED: ICD-10-CM

## 2022-08-15 DIAGNOSIS — D64.9 ANEMIA, UNSPECIFIED: ICD-10-CM

## 2022-08-15 DIAGNOSIS — M54.9 DORSALGIA, UNSPECIFIED: ICD-10-CM

## 2022-08-16 ENCOUNTER — APPOINTMENT (OUTPATIENT)
Dept: NEUROLOGY | Facility: CLINIC | Age: 47
End: 2022-08-16

## 2022-08-16 VITALS
DIASTOLIC BLOOD PRESSURE: 66 MMHG | HEIGHT: 63 IN | HEART RATE: 74 BPM | SYSTOLIC BLOOD PRESSURE: 99 MMHG | BODY MASS INDEX: 31.89 KG/M2 | WEIGHT: 180 LBS

## 2022-08-16 DIAGNOSIS — F45.8 OTHER SOMATOFORM DISORDERS: ICD-10-CM

## 2022-08-16 DIAGNOSIS — G47.00 INSOMNIA, UNSPECIFIED: ICD-10-CM

## 2022-08-16 PROCEDURE — 99214 OFFICE O/P EST MOD 30 MIN: CPT

## 2022-08-16 RX ORDER — METHYLPREDNISOLONE 4 MG/1
4 TABLET ORAL
Qty: 2 | Refills: 0 | Status: DISCONTINUED | COMMUNITY
Start: 2022-07-05

## 2022-08-16 RX ORDER — DICLOFENAC SODIUM 1% 10 MG/G
1 GEL TOPICAL
Qty: 200 | Refills: 0 | Status: DISCONTINUED | COMMUNITY
Start: 2022-06-22

## 2022-08-16 RX ORDER — CYCLOBENZAPRINE HYDROCHLORIDE 5 MG/1
5 TABLET, FILM COATED ORAL
Qty: 30 | Refills: 0 | Status: DISCONTINUED | COMMUNITY
Start: 2022-06-22

## 2022-08-16 RX ORDER — CYCLOBENZAPRINE HYDROCHLORIDE 10 MG/1
10 TABLET, FILM COATED ORAL
Qty: 15 | Refills: 0 | Status: DISCONTINUED | COMMUNITY
Start: 2022-07-05

## 2022-08-16 RX ORDER — IBUPROFEN 600 MG/1
600 TABLET, FILM COATED ORAL
Qty: 30 | Refills: 0 | Status: DISCONTINUED | COMMUNITY
Start: 2022-06-16

## 2022-08-16 RX ORDER — TRAMADOL HYDROCHLORIDE 50 MG/1
50 TABLET, COATED ORAL
Qty: 30 | Refills: 0 | Status: DISCONTINUED | COMMUNITY
Start: 2022-07-05

## 2022-08-16 NOTE — REASON FOR VISIT
[Follow-Up: _____] : a [unfilled] follow-up visit [Spouse] : spouse [Interpreters_Relationshiptopatient] :  [FreeTextEntry3] : [place]

## 2022-08-16 NOTE — ASSESSMENT
[FreeTextEntry1] : Patient has tension type headache.  She has insomnia and paresthesia related to hyperventilation from anxiety. \par \par She will start amitriptyline 10 mg at bedtime and if no benefit can increase to 20 mg daily hs.  Encourage exercise and weight loss and return for reevaluation in 3 months

## 2022-08-16 NOTE — HISTORY OF PRESENT ILLNESS
[FreeTextEntry1] : 47-year-old woman seen 1 month ago with chronic lower limb pain.  She claims gabapentin prescribed  at that time made her pain worse.  Since that time she visited the emergency room 3 times with complaints of neck pain, posterior throbbing headaches, not associated with photophobia and low back pain and groin pain bilaterally.  Work-up reviewed in   and [facility] emergency rooms were all negative.  CT scans of the head negative.  Duplex Doppler of the lower limbs negative.\par \par Patient also reports episodic tingling paresthesia in the fingertips and periorally.  Has been anxious about her pain and has insomnia..

## 2022-08-16 NOTE — PHYSICAL EXAM
[FreeTextEntry1] : Alert and oriented.  No aphasia.  Visual fields are full to confrontation.  Fundi reveal sharp disc margins.  Pupils are equal and constrict to light.  Extraocular movements intact.  Smile symmetric.  Hearing intact to finger rub.  Palate rises symmetrically and tongue protrudes in midline.  Neck is supple.  No bruits heard.  Heart sounds are normal.  No murmurs.  There is no focal weakness or sensory loss.  All tendon reflexes active and symmetric and plantar responses are flexor.  Gait and coordination intact.

## 2022-08-18 ENCOUNTER — NON-APPOINTMENT (OUTPATIENT)
Age: 47
End: 2022-08-18

## 2022-08-18 ENCOUNTER — APPOINTMENT (OUTPATIENT)
Dept: GASTROENTEROLOGY | Facility: CLINIC | Age: 47
End: 2022-08-18

## 2022-08-18 ENCOUNTER — APPOINTMENT (OUTPATIENT)
Dept: VASCULAR SURGERY | Facility: CLINIC | Age: 47
End: 2022-08-18

## 2022-08-18 ENCOUNTER — OUTPATIENT (OUTPATIENT)
Dept: OUTPATIENT SERVICES | Facility: HOSPITAL | Age: 47
LOS: 1 days | End: 2022-08-18

## 2022-08-18 VITALS
HEART RATE: 73 BPM | TEMPERATURE: 97 F | DIASTOLIC BLOOD PRESSURE: 74 MMHG | WEIGHT: 175 LBS | HEIGHT: 63 IN | SYSTOLIC BLOOD PRESSURE: 108 MMHG | BODY MASS INDEX: 31.01 KG/M2

## 2022-08-18 VITALS
HEART RATE: 86 BPM | BODY MASS INDEX: 34.55 KG/M2 | WEIGHT: 195 LBS | TEMPERATURE: 98.1 F | OXYGEN SATURATION: 98 % | HEIGHT: 63 IN | DIASTOLIC BLOOD PRESSURE: 60 MMHG | SYSTOLIC BLOOD PRESSURE: 96 MMHG

## 2022-08-18 DIAGNOSIS — I83.90 ASYMPTOMATIC VARICOSE VEINS OF UNSPECIFIED LOWER EXTREMITY: Chronic | ICD-10-CM

## 2022-08-18 PROCEDURE — 99204 OFFICE O/P NEW MOD 45 MIN: CPT

## 2022-08-18 PROCEDURE — 99205 OFFICE O/P NEW HI 60 MIN: CPT | Mod: GC

## 2022-08-18 NOTE — PHYSICAL EXAM
[de-identified] : She is in no acute distress but has headache. [de-identified] : Cranial nerves are grossly intact [de-identified] : Neck was supple [de-identified] : Breathing comfortably on room air [de-identified] : Regular rate and rhythm [FreeTextEntry1] : Abdomen was soft nontender not distended\par Palpable bilateral radial, femoral and pedal pulses\par Bilateral lower extremity no evidence of varicose veins.\par No evidence of arterial insufficiency

## 2022-08-18 NOTE — REVIEW OF SYSTEMS
[Fever] : no fever [Chills] : no chills [Feeling Poorly] : feeling poorly [Feeling Tired] : feeling tired [Lower Ext Edema] : lower extremity edema [Negative] : Heme/Lymph

## 2022-08-18 NOTE — PHYSICAL EXAM
[General Appearance - Alert] : alert [General Appearance - In No Acute Distress] : in no acute distress [Sclera] : the sclera and conjunctiva were normal [PERRL With Normal Accommodation] : pupils were equal in size, round, and reactive to light [Extraocular Movements] : extraocular movements were intact [Outer Ear] : the ears and nose were normal in appearance [Oropharynx] : the oropharynx was normal [Auscultation Breath Sounds / Voice Sounds] : lungs were clear to auscultation bilaterally [Heart Rate And Rhythm] : heart rate was normal and rhythm regular [Heart Sounds] : normal S1 and S2 [Heart Sounds Gallop] : no gallops [Murmurs] : no murmurs [Heart Sounds Pericardial Friction Rub] : no pericardial rub [Bowel Sounds] : normal bowel sounds [Abdomen Soft] : soft [Abdomen Tenderness] : non-tender [Abdomen Mass (___ Cm)] : no abdominal mass palpated [No CVA Tenderness] : no ~M costovertebral angle tenderness [No Spinal Tenderness] : no spinal tenderness [Abnormal Walk] : normal gait [Nail Clubbing] : no clubbing  or cyanosis of the fingernails [Musculoskeletal - Swelling] : no joint swelling seen [Motor Tone] : muscle strength and tone were normal [Skin Color & Pigmentation] : normal skin color and pigmentation [Skin Turgor] : normal skin turgor [] : no rash [Deep Tendon Reflexes (DTR)] : deep tendon reflexes were 2+ and symmetric [Sensation] : the sensory exam was normal to light touch and pinprick [No Focal Deficits] : no focal deficits [Oriented To Time, Place, And Person] : oriented to person, place, and time [Impaired Insight] : insight and judgment were intact [Affect] : the affect was normal

## 2022-08-18 NOTE — HISTORY OF PRESENT ILLNESS
[FreeTextEntry1] : 47-year-old female who presented to the office today for evaluation of bilateral lower extremity.  She recently had bilateral greater saphenous vein ablation in outside facility.  She complains of bilateral groin pain, radiating to the upper torso not related to exertion.  Not related to standing.  She also complains of headache, anxiety and sleep problem.\par \par She underwent ABIs in outside facility which are normal.\par \par Venous duplex examination was also normal with no evidence of DVT.

## 2022-08-18 NOTE — ASSESSMENT
[FreeTextEntry1] : This is a 47-year-old female status post bilateral venous ablation in outside facility.  Patient does not have any evidence of peripheral arterial disease given that her ABIs are normal and her pulses are palpable in physical examination.  I recommended to the patient to follow-up with her neurology and I do not think that her groin pains are vascular origin.

## 2022-08-19 NOTE — ASSESSMENT
[FreeTextEntry1] : #HELENE: rule out GI loss, patient with normal menstrual cycle and no excessive bleeding reported. \par #Constipation\par \par Recommendations:\par -Obtain TTG IgA and total IgA.\par -Plan for EGD/Colonoscopy (celiac and HP biopsy)\par -Start Miralax daily, can titrate to BID if needed\par -Prep ordered\par -Instructed to change iron supp every other day\par \par RTC after EGD/Colonoscopy\par \par

## 2022-08-19 NOTE — END OF VISIT
[FreeTextEntry3] : 47F with iron def anemia on oral iron supplement- dark stools \par FOBT neg\par no heavy menses, regular periods\par celiac screen\par egd/colon\par no family history\par \par Rec\par 1- IgA total\par 2- anti TTG IgA\par 3- EGD/colon\par

## 2022-08-19 NOTE — HISTORY OF PRESENT ILLNESS
[de-identified] : 46 yo F with lower back pain and HELENE present to GI clinic for further evaluation of her anemia. \par \par Patient reports she was diagnosed about 3-4 months ago and was started on iron pills. Patient denies having any blood in the stool. Does have dark/black stool since starting iron pills but is unsure if she had them before starting iron supp. Patient denies any weight loss, dysphagia, N/V, early satiety, and diarrhea. Patient is constipated and use "fiber" to move her bowel every day but still is constipated. Reports having abdominal discomfort when she is very constipated but improved with BM.\par \par No family history of liver/stomach/pancreatic/colon cancer.\par \par

## 2022-08-23 DIAGNOSIS — D64.9 ANEMIA, UNSPECIFIED: ICD-10-CM

## 2022-08-23 DIAGNOSIS — D50.9 IRON DEFICIENCY ANEMIA, UNSPECIFIED: ICD-10-CM

## 2022-08-23 DIAGNOSIS — K59.00 CONSTIPATION, UNSPECIFIED: ICD-10-CM

## 2022-08-25 ENCOUNTER — NON-APPOINTMENT (OUTPATIENT)
Age: 47
End: 2022-08-25

## 2022-08-25 LAB
IGA SER QL IEP: 182 MG/DL
TTG IGA SER IA-ACNC: <1.2 U/ML
TTG IGA SER-ACNC: NEGATIVE

## 2022-08-31 ENCOUNTER — OUTPATIENT (OUTPATIENT)
Dept: OUTPATIENT SERVICES | Facility: HOSPITAL | Age: 47
LOS: 1 days | End: 2022-08-31

## 2022-08-31 ENCOUNTER — APPOINTMENT (OUTPATIENT)
Dept: ORTHOPEDIC SURGERY | Facility: HOSPITAL | Age: 47
End: 2022-08-31

## 2022-08-31 VITALS
SYSTOLIC BLOOD PRESSURE: 121 MMHG | HEIGHT: 63 IN | HEART RATE: 75 BPM | BODY MASS INDEX: 34.2 KG/M2 | WEIGHT: 193 LBS | TEMPERATURE: 98.1 F | DIASTOLIC BLOOD PRESSURE: 73 MMHG

## 2022-08-31 DIAGNOSIS — I83.90 ASYMPTOMATIC VARICOSE VEINS OF UNSPECIFIED LOWER EXTREMITY: Chronic | ICD-10-CM

## 2022-09-02 NOTE — DISCUSSION/SUMMARY
[de-identified] : Discussed with patient nature of condition, potential course / sequelae, options reviewed.  NB shoe wear, activity modification, physical therapy / rehabilitation and associated modalities, HEP.  Return to office in 2 - 3 months / PRN spine clinic.  All questions answered.

## 2022-09-02 NOTE — HISTORY OF PRESENT ILLNESS
[FreeTextEntry1] : Reports chronic lumbago > cervicalgia for approximately 10 years.  Denies antecedent trauma nor additional musculoskeletal complaints.  Denies paresthesias nor weakness B UE and LE.

## 2022-09-06 DIAGNOSIS — M54.9 DORSALGIA, UNSPECIFIED: ICD-10-CM

## 2022-09-06 DIAGNOSIS — M54.2 CERVICALGIA: ICD-10-CM

## 2022-09-07 ENCOUNTER — OUTPATIENT (OUTPATIENT)
Dept: OUTPATIENT SERVICES | Facility: HOSPITAL | Age: 47
LOS: 1 days | End: 2022-09-07

## 2022-09-07 ENCOUNTER — NON-APPOINTMENT (OUTPATIENT)
Age: 47
End: 2022-09-07

## 2022-09-07 ENCOUNTER — APPOINTMENT (OUTPATIENT)
Dept: INTERNAL MEDICINE | Facility: CLINIC | Age: 47
End: 2022-09-07

## 2022-09-07 VITALS
WEIGHT: 194 LBS | OXYGEN SATURATION: 98 % | HEIGHT: 63 IN | HEART RATE: 76 BPM | TEMPERATURE: 98 F | DIASTOLIC BLOOD PRESSURE: 76 MMHG | SYSTOLIC BLOOD PRESSURE: 100 MMHG | BODY MASS INDEX: 34.38 KG/M2

## 2022-09-07 DIAGNOSIS — I83.90 ASYMPTOMATIC VARICOSE VEINS OF UNSPECIFIED LOWER EXTREMITY: Chronic | ICD-10-CM

## 2022-09-07 DIAGNOSIS — Z23 ENCOUNTER FOR IMMUNIZATION: ICD-10-CM

## 2022-09-07 PROCEDURE — 99214 OFFICE O/P EST MOD 30 MIN: CPT

## 2022-09-07 NOTE — PLAN
[FreeTextEntry1] : \par Patient is a 47 year old F coming with multiple concerns\par \par # hx of vascular problems\par - per recent vascular visit pt has no evidence of PAD\par \par #anemia\par - per last CBC no evidence of anemia\par - noted to have iron defiency, now on iron supplement\par - following GI- pending EGD/colonoscopy\par - will recheck iron studies after 3 mo of supplementation \par \par #HA\par - now on amitriptyline 20mg\par - advised to f/u with neurology\par -will also send message to neurology team regarding pt's continued HA and to see if any other rec \par \par #back pain\par - had MRI of spine showing degenerative changes\par - now with PT\par - following ortho\par \par #Chest pressure\par - following outside cardiology, pending stress test\par -advised to obtain records \par \par #HCM\par - flu shot today \par - advised to obtain medical records/ vaccination records \par -endorses getting mammo and pap this year, advised to obtain records \par \par f/u in 2 mo

## 2022-09-07 NOTE — HISTORY OF PRESENT ILLNESS
[FreeTextEntry1] : Pt comes in for a follow up [de-identified] : \mary jane Patient is a 47 year old F with hx of anemia, varicose veins, venous ablation and back pain coming in for a follow up. jazmin wu Pt reports that since her last visit she has followed vascular team and was told that there was no issues. No other interventions are needed. jazmin wu Patient continues to have posterior HA. Followed with neurology. Has been taking amitriptyline 20mg for a month. Pt endorses that HA is not improving. She is also taking OTC migraine medication but unable to say which one she is taking. Pt request neurology call back regarding this issue. \mary jane wu Pt has followed up with optho, was told her eye pressures are okay. Pt is pending a follow up appointment in October. \mary jane wu Patient currently going to PT for back pain. Endorses this issue is unchanged. Recently followed with ortho.jazmin wu Pt now following GI for her hx of anemia and evidence of iron deficiency. She denies any abnormal bleeding, no melena or hematochezia, no issues with her menstrual cycle. She is pending EGD/colonoscopy. jazmin wu Pt endorses following with an outside cardiologist. Endorses that she is pending to get a stress test. Denies any acute cardiac complain but notes that she has hx of chest pressure for which is actively undergoing work up.

## 2022-09-07 NOTE — INTERPRETER SERVICES
[Patient Declined  Services] : - None: Patient declined  services [FreeTextEntry3] : Pt prefers to speak with provider in [place] [TWNoteComboBox1] : [place]

## 2022-09-07 NOTE — PHYSICAL EXAM
[No Acute Distress] : no acute distress [Normal Oropharynx] : the oropharynx was normal [Supple] : supple [No Respiratory Distress] : no respiratory distress  [No Accessory Muscle Use] : no accessory muscle use [Clear to Auscultation] : lungs were clear to auscultation bilaterally [Normal Rate] : normal rate  [Regular Rhythm] : with a regular rhythm [Normal S1, S2] : normal S1 and S2 [Pedal Pulses Present] : the pedal pulses are present [No Edema] : there was no peripheral edema [No Extremity Clubbing/Cyanosis] : no extremity clubbing/cyanosis [Soft] : abdomen soft [Non Tender] : non-tender [Non-distended] : non-distended [Normal Bowel Sounds] : normal bowel sounds [Grossly Normal Strength/Tone] : grossly normal strength/tone [No Focal Deficits] : no focal deficits [Normal Affect] : the affect was normal [Normal Insight/Judgement] : insight and judgment were intact [de-identified] : Negative straight leg test

## 2022-09-08 DIAGNOSIS — Z23 ENCOUNTER FOR IMMUNIZATION: ICD-10-CM

## 2022-09-08 DIAGNOSIS — I73.9 PERIPHERAL VASCULAR DISEASE, UNSPECIFIED: ICD-10-CM

## 2022-09-08 DIAGNOSIS — R51.9 HEADACHE, UNSPECIFIED: ICD-10-CM

## 2022-09-08 DIAGNOSIS — D50.9 IRON DEFICIENCY ANEMIA, UNSPECIFIED: ICD-10-CM

## 2022-09-15 ENCOUNTER — APPOINTMENT (OUTPATIENT)
Dept: RADIOLOGY | Facility: HOSPITAL | Age: 47
End: 2022-09-15

## 2022-09-15 ENCOUNTER — OUTPATIENT (OUTPATIENT)
Dept: OUTPATIENT SERVICES | Facility: HOSPITAL | Age: 47
LOS: 1 days | End: 2022-09-15
Payer: MEDICAID

## 2022-09-15 ENCOUNTER — RESULT REVIEW (OUTPATIENT)
Age: 47
End: 2022-09-15

## 2022-09-15 DIAGNOSIS — R51.9 HEADACHE, UNSPECIFIED: ICD-10-CM

## 2022-09-15 LAB
APPEARANCE CSF: CLEAR — SIGNIFICANT CHANGE UP
COLOR CSF: SIGNIFICANT CHANGE UP
GLUCOSE CSF-MCNC: 58 MG/DL — SIGNIFICANT CHANGE UP (ref 40–70)
GRAM STN FLD: SIGNIFICANT CHANGE UP
LYMPHOCYTES # CSF: 75 % — SIGNIFICANT CHANGE UP (ref 40–80)
MONOS+MACROS NFR CSF: 20 % — SIGNIFICANT CHANGE UP (ref 15–45)
NEUTROPHILS # CSF: 5 % — SIGNIFICANT CHANGE UP (ref 0–6)
NRBC NFR CSF: 2 /UL — SIGNIFICANT CHANGE UP (ref 0–5)
PROT CSF-MCNC: 10 MG/DL — LOW (ref 15–45)
RBC # CSF: 183 /UL — HIGH (ref 0–0)
SPECIMEN SOURCE: SIGNIFICANT CHANGE UP
TUBE TYPE: SIGNIFICANT CHANGE UP

## 2022-09-15 PROCEDURE — 87205 SMEAR GRAM STAIN: CPT

## 2022-09-15 PROCEDURE — 84157 ASSAY OF PROTEIN OTHER: CPT

## 2022-09-15 PROCEDURE — 62328 DX LMBR SPI PNXR W/FLUOR/CT: CPT

## 2022-09-15 PROCEDURE — 82945 GLUCOSE OTHER FLUID: CPT

## 2022-09-15 PROCEDURE — 87070 CULTURE OTHR SPECIMN AEROBIC: CPT

## 2022-09-15 PROCEDURE — 89051 BODY FLUID CELL COUNT: CPT

## 2022-09-20 ENCOUNTER — APPOINTMENT (OUTPATIENT)
Dept: OBGYN | Facility: HOSPITAL | Age: 47
End: 2022-09-20

## 2022-09-20 LAB
CULTURE RESULTS: SIGNIFICANT CHANGE UP
GRAM STN FLD: SIGNIFICANT CHANGE UP
SPECIMEN SOURCE: SIGNIFICANT CHANGE UP

## 2022-10-20 NOTE — ASU PATIENT PROFILE, ADULT - FALL HARM RISK - UNIVERSAL INTERVENTIONS
Bed in lowest position, wheels locked, appropriate side rails in place/Call bell, personal items and telephone in reach/Instruct patient to call for assistance before getting out of bed or chair/Non-slip footwear when patient is out of bed/[place] to call system/Physically safe environment - no spills, clutter or unnecessary equipment/Purposeful Proactive Rounding/Room/bathroom lighting operational, light cord in reach

## 2022-10-21 ENCOUNTER — RESULT REVIEW (OUTPATIENT)
Age: 47
End: 2022-10-21

## 2022-10-21 ENCOUNTER — OUTPATIENT (OUTPATIENT)
Dept: OUTPATIENT SERVICES | Facility: HOSPITAL | Age: 47
LOS: 1 days | Discharge: HOME | End: 2022-10-21

## 2022-10-21 VITALS
OXYGEN SATURATION: 98 % | SYSTOLIC BLOOD PRESSURE: 96 MMHG | RESPIRATION RATE: 16 BRPM | HEART RATE: 78 BPM | DIASTOLIC BLOOD PRESSURE: 60 MMHG

## 2022-10-21 VITALS
HEIGHT: 62 IN | OXYGEN SATURATION: 99 % | RESPIRATION RATE: 14 BRPM | HEART RATE: 71 BPM | TEMPERATURE: 98 F | WEIGHT: 182.98 LBS | SYSTOLIC BLOOD PRESSURE: 103 MMHG | DIASTOLIC BLOOD PRESSURE: 60 MMHG

## 2022-10-21 DIAGNOSIS — I83.90 ASYMPTOMATIC VARICOSE VEINS OF UNSPECIFIED LOWER EXTREMITY: Chronic | ICD-10-CM

## 2022-10-21 DIAGNOSIS — K59.00 CONSTIPATION, UNSPECIFIED: ICD-10-CM

## 2022-10-21 LAB — HCG UR QL: NEGATIVE — SIGNIFICANT CHANGE UP

## 2022-10-21 PROCEDURE — 43239 EGD BIOPSY SINGLE/MULTIPLE: CPT | Mod: GC

## 2022-10-21 PROCEDURE — 88305 TISSUE EXAM BY PATHOLOGIST: CPT | Mod: 26

## 2022-10-21 PROCEDURE — 45378 DIAGNOSTIC COLONOSCOPY: CPT | Mod: GC

## 2022-10-25 LAB — SURGICAL PATHOLOGY STUDY: SIGNIFICANT CHANGE UP

## 2022-11-02 ENCOUNTER — APPOINTMENT (OUTPATIENT)
Dept: ORTHOPEDIC SURGERY | Facility: HOSPITAL | Age: 47
End: 2022-11-02

## 2022-11-02 ENCOUNTER — OUTPATIENT (OUTPATIENT)
Dept: OUTPATIENT SERVICES | Facility: HOSPITAL | Age: 47
LOS: 1 days | End: 2022-11-02

## 2022-11-02 VITALS
BODY MASS INDEX: 33.84 KG/M2 | DIASTOLIC BLOOD PRESSURE: 65 MMHG | SYSTOLIC BLOOD PRESSURE: 112 MMHG | HEART RATE: 82 BPM | WEIGHT: 191 LBS | TEMPERATURE: 97.9 F | HEIGHT: 63 IN

## 2022-11-02 DIAGNOSIS — M54.2 CERVICALGIA: ICD-10-CM

## 2022-11-02 DIAGNOSIS — I83.90 ASYMPTOMATIC VARICOSE VEINS OF UNSPECIFIED LOWER EXTREMITY: Chronic | ICD-10-CM

## 2022-11-04 ENCOUNTER — APPOINTMENT (OUTPATIENT)
Dept: NEUROLOGY | Facility: CLINIC | Age: 47
End: 2022-11-04

## 2022-11-04 VITALS
DIASTOLIC BLOOD PRESSURE: 69 MMHG | HEIGHT: 63 IN | WEIGHT: 191 LBS | BODY MASS INDEX: 33.84 KG/M2 | SYSTOLIC BLOOD PRESSURE: 108 MMHG | HEART RATE: 77 BPM

## 2022-11-04 DIAGNOSIS — M54.9 DORSALGIA, UNSPECIFIED: ICD-10-CM

## 2022-11-04 DIAGNOSIS — R51.9 HEADACHE, UNSPECIFIED: ICD-10-CM

## 2022-11-04 DIAGNOSIS — M54.2 CERVICALGIA: ICD-10-CM

## 2022-11-04 PROCEDURE — 99214 OFFICE O/P EST MOD 30 MIN: CPT

## 2022-11-05 NOTE — HISTORY OF PRESENT ILLNESS
[FreeTextEntry1] : 47-year-old woman seen 4 months ago with chronic lower limb pain.  She claims gabapentin prescribed  at that time made her pain worse.  Since that time she visited the emergency room 3 times with complaints of neck pain, posterior throbbing headaches, not associated with photophobia and low back pain and groin pain bilaterally.  Work-up reviewed in  [facility] and [facility] emergency rooms were all negative.  CT scans of the head negative.  Duplex Doppler of the lower limbs negative.\par \par Patient also reported episodic tingling paresthesia in the fingertips and periorally.  Has been anxious about her pain and has insomnia and was started on amitriptyline 10 mg and raised to 20 mg hs. Amitriptylne reduced her headaches but she has complained of constipation.

## 2022-11-05 NOTE — ASSESSMENT
[FreeTextEntry1] : D/c amitriptyline 10 days after starting zonisamide 100 mg hs. Zonisamide, a carbonic anyhdrase inhibitor, similar to topiramate has been found useful in chronic daily headache  (off label) and has side effect of weight loss.\par \par She recently saw her orthopedist who advised follow-up with physiatry for her chronic back pain.\par \par Return for follow-up in 4 months.

## 2022-11-08 ENCOUNTER — NON-APPOINTMENT (OUTPATIENT)
Age: 47
End: 2022-11-08

## 2022-11-09 ENCOUNTER — APPOINTMENT (OUTPATIENT)
Dept: INTERNAL MEDICINE | Facility: CLINIC | Age: 47
End: 2022-11-09

## 2022-11-15 ENCOUNTER — APPOINTMENT (OUTPATIENT)
Dept: INTERNAL MEDICINE | Facility: CLINIC | Age: 47
End: 2022-11-15

## 2022-11-15 ENCOUNTER — OUTPATIENT (OUTPATIENT)
Dept: OUTPATIENT SERVICES | Facility: HOSPITAL | Age: 47
LOS: 1 days | End: 2022-11-15

## 2022-11-15 VITALS
SYSTOLIC BLOOD PRESSURE: 110 MMHG | HEART RATE: 75 BPM | DIASTOLIC BLOOD PRESSURE: 70 MMHG | HEIGHT: 63 IN | TEMPERATURE: 97.5 F | BODY MASS INDEX: 32.96 KG/M2 | WEIGHT: 186 LBS | OXYGEN SATURATION: 98 %

## 2022-11-15 DIAGNOSIS — I83.90 ASYMPTOMATIC VARICOSE VEINS OF UNSPECIFIED LOWER EXTREMITY: Chronic | ICD-10-CM

## 2022-11-15 PROCEDURE — 99214 OFFICE O/P EST MOD 30 MIN: CPT

## 2022-11-15 RX ORDER — FAMOTIDINE 20 MG/1
20 TABLET, FILM COATED ORAL
Qty: 60 | Refills: 0 | Status: DISCONTINUED | COMMUNITY
Start: 2022-06-16 | End: 2022-11-15

## 2022-11-15 RX ORDER — POLYETHYLENE GLYCOL 3350 17 G/17G
17 POWDER, FOR SOLUTION ORAL DAILY
Qty: 30 | Refills: 3 | Status: DISCONTINUED | COMMUNITY
Start: 2022-08-18 | End: 2022-11-15

## 2022-11-15 RX ORDER — NAPROXEN 500 MG/1
500 TABLET ORAL
Qty: 60 | Refills: 0 | Status: DISCONTINUED | COMMUNITY
Start: 2022-06-23 | End: 2022-11-15

## 2022-11-15 RX ORDER — ACETAMINOPHEN 500 MG/1
500 TABLET ORAL
Refills: 0 | Status: DISCONTINUED | COMMUNITY
Start: 2022-08-05 | End: 2022-11-15

## 2022-11-15 RX ORDER — POLYETHYLENE GLYCOL 3350 AND ELECTROLYTES WITH LEMON FLAVOR 236; 22.74; 6.74; 5.86; 2.97 G/4L; G/4L; G/4L; G/4L; G/4L
236 POWDER, FOR SOLUTION ORAL
Qty: 1 | Refills: 0 | Status: DISCONTINUED | COMMUNITY
Start: 2022-08-18 | End: 2022-11-15

## 2022-11-15 RX ORDER — CHLORHEXIDINE GLUCONATE 4 %
325 (65 FE) LIQUID (ML) TOPICAL
Qty: 45 | Refills: 1 | Status: DISCONTINUED | COMMUNITY
Start: 2022-08-05 | End: 2022-11-15

## 2022-11-15 RX ORDER — MULTIVIT,IRON,MINERALS/LUTEIN
TABLET ORAL DAILY
Refills: 0 | Status: DISCONTINUED | COMMUNITY
Start: 2022-08-05 | End: 2022-11-15

## 2022-11-15 NOTE — HISTORY OF PRESENT ILLNESS
[FreeTextEntry1] : f/u [de-identified] : \mary jane Patient is a 47 year old F with hx of anemia, varicose veins, venous ablation and back pain coming in for a follow up. \mary jane \mary jane In terms of her headache- On amitriptyline 20mg QHS. Did not tolerate zonisamide due to rash. She continues to have HA every day. Happens 2-3 x a day, lasts for 2 hrs.  Notes that HA starts on the back or to the L side of her head. Pain is pressure like. No light or sound sensitivity. Denies any visual changes. Sometimes gets eye pain. Went to her eye doctor about a month ago and was told everything was normal- normal eye pressure.\mary jane \mary jane In terms of her hx of iron deficiency- endorses no longer taking iron supplement. Had and EGD and colonoscopy which did not show evidence of bleeding. Pt  continues to have regular periods lasts ~4-5 days. Denies abnormal bleeding aside from 1 day of heavy flow. Does not count how many pads she uses. Last GYN visit was about 1 mo, reports getting a TVUS for unclear reason and was told everything was okay. \mary jane \mary jane Pt reports up to date with mammogram in ~September and was told it was normal. Needs repeat mammo in 1 yr. Pt had pap smear about 1.5 mo (~October) and was told it was normal.  \mary jane \mary jane Pt reports that she continues to have back pain, but her gait and movement have been improving with physical therapy. Pt also endorses getting occasional burning on her inner thighs (has reported similar complaint to vascular team in the past), but denies any leg weakness, urinary/fecal  incontinence. Pt already following ortho/neurology, pending PM&R f/u. \mary jane \mary jane Pt reports no cardiac concerns today. Followed an outside cardiologist and was told that her stress test was negative.

## 2022-11-15 NOTE — INTERPRETER SERVICES
[Patient Declined  Services] : - None: Patient declined  services [FreeTextEntry3] : Pt prefers to speak to provider in [place]  [TWNoteComboBox1] : [place]

## 2022-11-15 NOTE — PLAN
[FreeTextEntry1] : \par Patient is a 47 year old F coming for a follow up\par \par #hx of anemia\par -s/p EGD and colonoscopy- no evidence of bleeding\par - pt now off of iron supplements, will repeat CBC and iron studies \par - pt f/u with GYN, endorses getting a TVUS for unclear reasons, advised to provide records\par \par #HA\par - continues to have constant tension type HA\par - On amitriptyline 20 mg, did not tolerate Zonisamide 100 mg due to reported rash\par - will increase amitriptyline to 30mg per neuro recs\par - cont f/u w/ neurology\par \par #back pain\par - now endorsing burning thigh pain associated with her back pain, denies any red flag symps\par - had MRI of spine showing degenerative changes\par - now with PT\par - advised to cont f/u w/ ortho/ neurology\par - pending to f/u with PM&R\par \par #Chest pressure\par - resolved \par - following outside cardiology, reports stress test was negative \par -advised to obtain records \par \par #HCM\par - up to date with flu shot, tdap today \par - advised to obtain medical records/ vaccination records \par -endorses getting mammo and pap this year, advised to obtain records \par \par f/u in 3 mo or PRN

## 2022-11-15 NOTE — PHYSICAL EXAM
[No Acute Distress] : no acute distress [Normal Oropharynx] : the oropharynx was normal [Supple] : supple [No Respiratory Distress] : no respiratory distress  [No Accessory Muscle Use] : no accessory muscle use [Clear to Auscultation] : lungs were clear to auscultation bilaterally [Normal Rate] : normal rate  [Regular Rhythm] : with a regular rhythm [Normal S1, S2] : normal S1 and S2 [Pedal Pulses Present] : the pedal pulses are present [No Edema] : there was no peripheral edema [No Extremity Clubbing/Cyanosis] : no extremity clubbing/cyanosis [Soft] : abdomen soft [Non Tender] : non-tender [Non-distended] : non-distended [Normal Bowel Sounds] : normal bowel sounds [Grossly Normal Strength/Tone] : grossly normal strength/tone [No Focal Deficits] : no focal deficits [Normal Affect] : the affect was normal [Normal Insight/Judgement] : insight and judgment were intact [de-identified] : Negative straight leg test

## 2022-11-16 ENCOUNTER — NON-APPOINTMENT (OUTPATIENT)
Age: 47
End: 2022-11-16

## 2022-11-16 DIAGNOSIS — Z23 ENCOUNTER FOR IMMUNIZATION: ICD-10-CM

## 2022-11-16 DIAGNOSIS — Z00.00 ENCOUNTER FOR GENERAL ADULT MEDICAL EXAMINATION WITHOUT ABNORMAL FINDINGS: ICD-10-CM

## 2022-11-16 DIAGNOSIS — D50.9 IRON DEFICIENCY ANEMIA, UNSPECIFIED: ICD-10-CM

## 2022-11-16 DIAGNOSIS — R51.9 HEADACHE, UNSPECIFIED: ICD-10-CM

## 2022-11-16 DIAGNOSIS — M54.9 DORSALGIA, UNSPECIFIED: ICD-10-CM

## 2022-11-16 LAB
BASOPHILS # BLD AUTO: 0.03 K/UL
BASOPHILS NFR BLD AUTO: 0.6 %
EOSINOPHIL # BLD AUTO: 0.23 K/UL
EOSINOPHIL NFR BLD AUTO: 4.3 %
FERRITIN SERPL-MCNC: 9 NG/ML
FOLATE SERPL-MCNC: 13.6 NG/ML
HCT VFR BLD CALC: 41.1 %
HGB BLD-MCNC: 12.7 G/DL
IMM GRANULOCYTES NFR BLD AUTO: 0.2 %
IRON SATN MFR SERPL: 9 %
IRON SERPL-MCNC: 37 UG/DL
LYMPHOCYTES # BLD AUTO: 2.16 K/UL
LYMPHOCYTES NFR BLD AUTO: 40.3 %
MAN DIFF?: NORMAL
MCHC RBC-ENTMCNC: 27 PG
MCHC RBC-ENTMCNC: 30.9 GM/DL
MCV RBC AUTO: 87.4 FL
MONOCYTES # BLD AUTO: 0.37 K/UL
MONOCYTES NFR BLD AUTO: 6.9 %
NEUTROPHILS # BLD AUTO: 2.56 K/UL
NEUTROPHILS NFR BLD AUTO: 47.7 %
PLATELET # BLD AUTO: 345 K/UL
RBC # BLD: 4.7 M/UL
RBC # FLD: 14.3 %
TIBC SERPL-MCNC: 399 UG/DL
TRANSFERRIN SERPL-MCNC: 357 MG/DL
UIBC SERPL-MCNC: 363 UG/DL
VIT B12 SERPL-MCNC: 499 PG/ML
WBC # FLD AUTO: 5.36 K/UL

## 2022-12-13 ENCOUNTER — APPOINTMENT (OUTPATIENT)
Dept: INTERNAL MEDICINE | Facility: CLINIC | Age: 47
End: 2022-12-13

## 2023-01-06 ENCOUNTER — APPOINTMENT (OUTPATIENT)
Dept: NEUROLOGY | Facility: CLINIC | Age: 48
End: 2023-01-06
Payer: MEDICAID

## 2023-01-06 VITALS
BODY MASS INDEX: 31.01 KG/M2 | DIASTOLIC BLOOD PRESSURE: 78 MMHG | WEIGHT: 175 LBS | HEIGHT: 63 IN | HEART RATE: 82 BPM | SYSTOLIC BLOOD PRESSURE: 107 MMHG

## 2023-01-06 PROCEDURE — 99213 OFFICE O/P EST LOW 20 MIN: CPT

## 2023-01-06 NOTE — HISTORY OF PRESENT ILLNESS
[FreeTextEntry1] : 47 yr-old woman seen 4 1/2 months ago with chronic low back pain and headaches. No benefit from gabapentin. All imaging negative. Amitriptyline started and now increased to 30 mg at bedtime with relief of insomnia and headaches, however, still c/o low back pain.

## 2023-01-06 NOTE — ASSESSMENT
[FreeTextEntry1] : Chronic low back pain with no objective findings on exam Will refer to pain management (Dr. Moser) for additional opinion.

## 2023-01-06 NOTE — REVIEW OF SYSTEMS
[As Noted in HPI] : as noted in HPI [Constipation] : constipation [Negative] : Heme/Lymph [FreeTextEntry7] : secondary to amitriptyline but relieved with Miralax

## 2023-02-15 ENCOUNTER — APPOINTMENT (OUTPATIENT)
Dept: ORTHOPEDIC SURGERY | Facility: HOSPITAL | Age: 48
End: 2023-02-15

## 2023-02-16 ENCOUNTER — APPOINTMENT (OUTPATIENT)
Dept: INTERNAL MEDICINE | Facility: CLINIC | Age: 48
End: 2023-02-16
Payer: MEDICAID

## 2023-02-16 ENCOUNTER — OUTPATIENT (OUTPATIENT)
Dept: OUTPATIENT SERVICES | Facility: HOSPITAL | Age: 48
LOS: 1 days | End: 2023-02-16

## 2023-02-16 VITALS
HEIGHT: 63 IN | BODY MASS INDEX: 32.96 KG/M2 | TEMPERATURE: 98.5 F | WEIGHT: 186 LBS | SYSTOLIC BLOOD PRESSURE: 92 MMHG | DIASTOLIC BLOOD PRESSURE: 74 MMHG | HEART RATE: 94 BPM | OXYGEN SATURATION: 97 %

## 2023-02-16 DIAGNOSIS — H72.92 UNSPECIFIED PERFORATION OF TYMPANIC MEMBRANE, LEFT EAR: ICD-10-CM

## 2023-02-16 DIAGNOSIS — R51.9 HEADACHE, UNSPECIFIED: ICD-10-CM

## 2023-02-16 DIAGNOSIS — E61.1 IRON DEFICIENCY: ICD-10-CM

## 2023-02-16 DIAGNOSIS — H66.90 OTITIS MEDIA, UNSPECIFIED, UNSPECIFIED EAR: ICD-10-CM

## 2023-02-16 DIAGNOSIS — K29.70 GASTRITIS, UNSPECIFIED, WITHOUT BLEEDING: ICD-10-CM

## 2023-02-16 DIAGNOSIS — I83.90 ASYMPTOMATIC VARICOSE VEINS OF UNSPECIFIED LOWER EXTREMITY: Chronic | ICD-10-CM

## 2023-02-16 DIAGNOSIS — R05.9 COUGH, UNSPECIFIED: ICD-10-CM

## 2023-02-16 PROCEDURE — 99214 OFFICE O/P EST MOD 30 MIN: CPT

## 2023-02-16 RX ORDER — FAMOTIDINE 20 MG/1
20 TABLET, FILM COATED ORAL TWICE DAILY
Qty: 60 | Refills: 1 | Status: ACTIVE | COMMUNITY
Start: 2023-02-16 | End: 1900-01-01

## 2023-02-16 NOTE — PLAN
[FreeTextEntry1] : \par Patient is a 47 year old F coming for a follow up\par \par #otitis media L TM\par - question perforation on exam\par - advised to f/u with ENT as soon as possible\par - amoxicillin-clavulanic acid sent for 10 day course \par -ED precautions given to pt \par \par #URI/cough\par - cont with tylenol PRN, adequate hydration, salt water gargles for throat inflammation, rest\par - benzonatate and nasal saline sent\par - ED precautions given to pt \par \par #hx of anemia\par -s/p EGD and colonoscopy- no evidence of bleeding\par -pt reports taking iron-- will obtain iron studies today \par -pt has been following with GYN- advised to provide records \par \par #reflux\par - endorses now worse\par - had EGD showing gastritis in 2022\par - discussed reflux diet w pt, sent famotidine 20mg BID to see if it helps \par -advised to cont f/u with GI \par \par #HA\par - reports improved on amitriptyline to 30mg per neuro\par - cont f/u w/ neurology\par \par #back pain\par - now endorsing burning thigh pain associated with her back pain, denies any red flag symps\par - had MRI of spine showing degenerative changes\par - pending to f/u with pain management \par \par #Chest pressure\par - resolved \par - following outside cardiology, reports stress test was negative \par -advised to obtain records \par \par #HCM\par - up to date with flu shot, tdap \par - advised to obtain medical records/ vaccination records \par -endorses getting mammo and pap-- advised to obtain records  \par \par f/u in 1 mo

## 2023-02-16 NOTE — HISTORY OF PRESENT ILLNESS
[FreeTextEntry1] : f/u [de-identified] : \par Patient is a 47 year old F with hx of anemia, varicose veins, venous ablation and back pain coming in for a follow up. \par \par Patient complains of a cold. Tested negative for COVID.  Started having HA/eye pain, cough, nasal congestion, sore throat, muscular aches, eye pain and L ear pain. Endorses that she hears muffled sounds out of her L ear and it is painful.  Denies fevers. Taking Tylenol and drinking a lot of water. \par \par In terms of her headache- On amitriptyline 30mg QHS is helping.\par \par In terms of her hx of iron deficiency- Had and EGD and colonoscopy which did not show evidence of bleeding. Pt  continues to have regular periods lasts ~4-5 days. Has heavy bleeding the first 1-3days. Last GYN visit was 3 month ago- had a pap smear and a TVUS and was told everything is okay. \par \par Pt reports up to date with mammogram and pap. \par \par Pt reports no cardiac concerns today. Followed an outside cardiologist last month and was told that everything was okay- had a stress test and echo that was negative. \par \par Patient complaining of reflux for the last 1-2 years. Pt had endoscopy in Oct 2022 showing gastritis. Pt endorses burning sensation in the epigastric region- takes a medication from her  but does not remember what. Has chills but denies fevers, unintentional wt, denies vomiting, diarrhea, dysphagia, odynophagia, melena or hematochezia. Has constipation which is improved on MiraLAX. Avoids spicy, greasy foods, caffeine, avoids late eating.

## 2023-02-16 NOTE — PHYSICAL EXAM
[No Acute Distress] : no acute distress [Normal Oropharynx] : the oropharynx was normal [Supple] : supple [No Respiratory Distress] : no respiratory distress  [No Accessory Muscle Use] : no accessory muscle use [Clear to Auscultation] : lungs were clear to auscultation bilaterally [Normal Rate] : normal rate  [Regular Rhythm] : with a regular rhythm [Normal S1, S2] : normal S1 and S2 [Pedal Pulses Present] : the pedal pulses are present [No Edema] : there was no peripheral edema [No Extremity Clubbing/Cyanosis] : no extremity clubbing/cyanosis [Soft] : abdomen soft [Non Tender] : non-tender [Non-distended] : non-distended [Normal Bowel Sounds] : normal bowel sounds [Grossly Normal Strength/Tone] : grossly normal strength/tone [No Focal Deficits] : no focal deficits [Normal Affect] : the affect was normal [Normal Insight/Judgement] : insight and judgment were intact [de-identified] : L TM very erythematous, ? perforation; boggy nasal mucosa  [de-identified] : Negative straight leg test

## 2023-02-17 LAB
BASOPHILS # BLD AUTO: 0.02 K/UL
BASOPHILS NFR BLD AUTO: 0.3 %
EOSINOPHIL # BLD AUTO: 0.14 K/UL
EOSINOPHIL NFR BLD AUTO: 2.1 %
FERRITIN SERPL-MCNC: 21 NG/ML
FOLATE SERPL-MCNC: 16.1 NG/ML
HCT VFR BLD CALC: 40.2 %
HGB BLD-MCNC: 13.1 G/DL
IMM GRANULOCYTES NFR BLD AUTO: 0.3 %
IRON SATN MFR SERPL: 5 %
IRON SERPL-MCNC: 20 UG/DL
LYMPHOCYTES # BLD AUTO: 1.62 K/UL
LYMPHOCYTES NFR BLD AUTO: 24.1 %
MAN DIFF?: NORMAL
MCHC RBC-ENTMCNC: 27.6 PG
MCHC RBC-ENTMCNC: 32.6 GM/DL
MCV RBC AUTO: 84.8 FL
MONOCYTES # BLD AUTO: 0.77 K/UL
MONOCYTES NFR BLD AUTO: 11.4 %
NEUTROPHILS # BLD AUTO: 4.16 K/UL
NEUTROPHILS NFR BLD AUTO: 61.8 %
PLATELET # BLD AUTO: 272 K/UL
RBC # BLD: 4.74 M/UL
RBC # BLD: 4.74 M/UL
RBC # FLD: 14.9 %
RETICS # AUTO: 1.3 %
RETICS AGGREG/RBC NFR: 60.7 K/UL
TIBC SERPL-MCNC: 400 UG/DL
TRANSFERRIN SERPL-MCNC: 319 MG/DL
UIBC SERPL-MCNC: 379 UG/DL
VIT B12 SERPL-MCNC: 506 PG/ML
WBC # FLD AUTO: 6.73 K/UL

## 2023-03-09 ENCOUNTER — APPOINTMENT (OUTPATIENT)
Dept: NEUROLOGY | Facility: CLINIC | Age: 48
End: 2023-03-09
Payer: MEDICAID

## 2023-03-09 VITALS
HEIGHT: 63 IN | HEART RATE: 77 BPM | SYSTOLIC BLOOD PRESSURE: 105 MMHG | WEIGHT: 192 LBS | BODY MASS INDEX: 34.02 KG/M2 | DIASTOLIC BLOOD PRESSURE: 71 MMHG

## 2023-03-09 DIAGNOSIS — G44.86 CERVICOGENIC HEADACHE: ICD-10-CM

## 2023-03-09 PROCEDURE — 99213 OFFICE O/P EST LOW 20 MIN: CPT

## 2023-03-09 NOTE — ASSESSMENT
[FreeTextEntry1] : Patient is aware that I am leaving [facility] at the end of this month.  She will follow-up with pain management next month.

## 2023-03-09 NOTE — HISTORY OF PRESENT ILLNESS
[FreeTextEntry1] : 48yr-old woman seen 2 months ago with chronic low back pain and headaches. No benefit from gabapentin. All imaging negative. Amitriptyline started and now increased to 30 mg at bedtime with relief of insomnia, however she has recurrence of the posterior headaches and some posterior scalp paresthesia.

## 2023-03-09 NOTE — PHYSICAL EXAM
[FreeTextEntry1] : Except for hypesthesia to pin in the C2 dermatome bilaterally neurologic examination is normal.  There are no other focal neurologic deficits.

## 2023-03-16 ENCOUNTER — APPOINTMENT (OUTPATIENT)
Dept: INTERNAL MEDICINE | Facility: CLINIC | Age: 48
End: 2023-03-16

## 2023-04-21 ENCOUNTER — APPOINTMENT (OUTPATIENT)
Dept: INTERNAL MEDICINE | Facility: CLINIC | Age: 48
End: 2023-04-21
Payer: MEDICAID

## 2023-04-21 ENCOUNTER — OUTPATIENT (OUTPATIENT)
Dept: OUTPATIENT SERVICES | Facility: HOSPITAL | Age: 48
LOS: 1 days | End: 2023-04-21

## 2023-04-21 ENCOUNTER — APPOINTMENT (OUTPATIENT)
Dept: PAIN MANAGEMENT | Facility: CLINIC | Age: 48
End: 2023-04-21
Payer: MEDICAID

## 2023-04-21 VITALS
BODY MASS INDEX: 32.78 KG/M2 | HEART RATE: 80 BPM | WEIGHT: 185 LBS | HEIGHT: 63 IN | DIASTOLIC BLOOD PRESSURE: 76 MMHG | SYSTOLIC BLOOD PRESSURE: 108 MMHG

## 2023-04-21 VITALS
HEART RATE: 75 BPM | WEIGHT: 191 LBS | OXYGEN SATURATION: 98 % | BODY MASS INDEX: 33.84 KG/M2 | SYSTOLIC BLOOD PRESSURE: 100 MMHG | DIASTOLIC BLOOD PRESSURE: 70 MMHG | HEIGHT: 63 IN

## 2023-04-21 DIAGNOSIS — K59.00 CONSTIPATION, UNSPECIFIED: ICD-10-CM

## 2023-04-21 DIAGNOSIS — R51.9 HEADACHE, UNSPECIFIED: ICD-10-CM

## 2023-04-21 DIAGNOSIS — I83.819 VARICOSE VEINS OF UNSPECIFIED LOWER EXTREMITY WITH PAIN: ICD-10-CM

## 2023-04-21 DIAGNOSIS — D64.9 ANEMIA, UNSPECIFIED: ICD-10-CM

## 2023-04-21 DIAGNOSIS — I83.90 ASYMPTOMATIC VARICOSE VEINS OF UNSPECIFIED LOWER EXTREMITY: Chronic | ICD-10-CM

## 2023-04-21 DIAGNOSIS — M54.9 DORSALGIA, UNSPECIFIED: ICD-10-CM

## 2023-04-21 PROCEDURE — 99204 OFFICE O/P NEW MOD 45 MIN: CPT

## 2023-04-21 PROCEDURE — 99214 OFFICE O/P EST MOD 30 MIN: CPT

## 2023-04-21 RX ORDER — PSYLLIUM SEED (WITH DEXTROSE)
28 POWDER (GRAM) ORAL
Qty: 30 | Refills: 3 | Status: ACTIVE | COMMUNITY
Start: 2023-04-21 | End: 1900-01-01

## 2023-04-21 NOTE — PLAN
[FreeTextEntry1] : \par Patient is a 47 year old F coming for a follow up\par \par #varicose vein pain\par -will refer to vascular for further eval \par \par #otitis \par -resolved \par \par #hx of anemia\par -s/p EGD and colonoscopy- no evidence of bleeding\par -pt reports taking iron-- will obtain iron next visit\par -pt has been following with GYN- pt states that she was told her periods are not the source of her anemia\par -advised to obtain records, will cont to assess next visit \par \par #reflux\par - had EGD showing gastritis in 2022\par - discussed reflux diet w pt, sent famotidine 20mg BID to see if it helps \par -advised to cont f/u with GI \par \par #constipation\par - endorses miralax has helped, can cont to use PRN\par -will send Metamucil, advised to use daily with a lot of water and vegetable intake \par \par #HA\par - reports improved amitriptyline, advised to clarify dose with neurologist as she is taking 20mg \par - cont f/u w/ neurology\par \par #back pain\par - now endorsing burning thigh pain associated with her back pain, denies any red flag symps\par - had MRI of spine showing degenerative changes\par - pending to f/u with pain management \par \par # hx of Chest pressure\par - resolved \par - following outside cardiology, reports stress test was negative \par -advised to obtain records \par \par #HCM\par - up to date with flu shot, tdap \par - advised to obtain medical records/ vaccination records \par -endorses getting mammo and pap-- advised to obtain records  \par \par f/u in 1 mo

## 2023-04-21 NOTE — ASSESSMENT
[FreeTextEntry1] : Chronic headache ... non migraine. Headache essentially not interfering with her life. Able to continue her activities. Discussed maintaining a diary.\par \par Discussed other treatment options, Increasing Elavil to a max of 75 mgs qhs gradually. Consider implementing medrol dose kirill. Advised of potential AEs. \par \par Following further discussion, patient will take Medrol dose kirill and continue on same dose now of Elavil and Zonisamide..

## 2023-04-21 NOTE — REVIEW OF SYSTEMS
[Constipation] : constipation [Back Pain] : back pain [Headache] : headache [Negative] : Neurological [FreeTextEntry7] : endorses constipation, taking miralax PRN

## 2023-04-21 NOTE — HISTORY OF PRESENT ILLNESS
Spoke with patient regarding post Ed visit and need for follow up, she stated she is still having migraine and is not able to come in today  But she did schedule follow up for Friday with Dr Esperanza Summers, she is resting at home . [FreeTextEntry1] : f/u [de-identified] : \par Patient is a 48 year old F with hx of anemia, varicose veins, venous ablation and back pain coming in for a follow up. \par \par Patient is pending to go to a pain specialist for her back pain. Pt reports her pain persists, but  PT has helped. \par \par Patient endorses her HA is improved, reports taking 20mg of amitriptyline which helps. Following closely w neurology.\par \par Patient is complaining of leg thigh pain at the area of her varicose vein. Endorses varicose vein has enlarged. Patient denies fevers, chills, redness. \par \par Pt reports going to ENT and was prescribed medication for her otitis. Denies any ear complaints.\par

## 2023-04-21 NOTE — PHYSICAL EXAM
[No Acute Distress] : no acute distress [Normal Sclera/Conjunctiva] : normal sclera/conjunctiva [Normal Oropharynx] : the oropharynx was normal [Normal TMs] : both tympanic membranes were normal [Supple] : supple [No Respiratory Distress] : no respiratory distress  [No Accessory Muscle Use] : no accessory muscle use [Clear to Auscultation] : lungs were clear to auscultation bilaterally [Normal Rate] : normal rate  [Regular Rhythm] : with a regular rhythm [Normal S1, S2] : normal S1 and S2 [Pedal Pulses Present] : the pedal pulses are present [No Edema] : there was no peripheral edema [No Extremity Clubbing/Cyanosis] : no extremity clubbing/cyanosis [Soft] : abdomen soft [Non Tender] : non-tender [Non-distended] : non-distended [Normal Bowel Sounds] : normal bowel sounds [Grossly Normal Strength/Tone] : grossly normal strength/tone [No Focal Deficits] : no focal deficits [Normal Affect] : the affect was normal [Normal Insight/Judgement] : insight and judgment were intact [de-identified] : has varicose veins on L thigh and lower L leg, non tender to palpation
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
## 2023-04-21 NOTE — PHYSICAL EXAM
[FreeTextEntry1] : Constitutional: No signs of distress or signs of toxicity. \par Mental Status: Alert and well oriented. Speech fluent. No aphasia. Fund of knowledge intact. \par Psychiatric: Mood stable.\par Cranial Nerve: PERRLA: No papilledema; No VFC: No [place]. V1-3 intact. No facial asymmetry, hearing grossly intact; palate elevates symmetrically, tongue midline\par Motor:No involuntary movements noted.  Adequate bulk, tone throughout, 5/5 strength of all muscle groups \par DTR: present and symmetrical; no clonus, plantars  downgoing\par Sensory: intact to primary and secondary modalities; neg Romberg\par Cerebellar: adequate finger to nose and heel to shin bilaterally.\par Gait: non antalgic or ataxic.\par Eyes: no redness or swelling\par HEENT: intact; no signs of trauma.\par Neck: No masses noted\par Pulmonary: no respiratory distress\par Vascular: no temperature, color change or sudomotor changes.; no edema\par Musculoskeletal: examination of the cervical spine reveals no midline tenderness, range of motion full upon flexion, extension and lateral rotation. Negative facet tenderness, Negative Spurlings bilaterally. examination of the lumbar spine reveals no midline or paraspinal tenderness; Range of motion full upon flexion, extension and lateral rotation; negative facet loading, No tenderness of sciatic notch, No tenderness of bilateral greater trochanters, Negative ARI, negative SLRT bilaterally,\par Skin: No rash.\par

## 2023-05-04 ENCOUNTER — APPOINTMENT (OUTPATIENT)
Dept: VASCULAR SURGERY | Facility: CLINIC | Age: 48
End: 2023-05-04
Payer: MEDICAID

## 2023-05-04 VITALS
WEIGHT: 185 LBS | HEIGHT: 63 IN | TEMPERATURE: 99 F | BODY MASS INDEX: 32.78 KG/M2 | HEART RATE: 80 BPM | SYSTOLIC BLOOD PRESSURE: 99 MMHG | DIASTOLIC BLOOD PRESSURE: 69 MMHG

## 2023-05-04 PROCEDURE — 99214 OFFICE O/P EST MOD 30 MIN: CPT

## 2023-05-04 PROCEDURE — 93970 EXTREMITY STUDY: CPT

## 2023-05-09 NOTE — ASSESSMENT
[FreeTextEntry1] : 48-year-old female status post bilateral greater saphenous vein ablation in outside facility.  Commended to patient to undergo conservative management with compression stocking and leg elevation.

## 2023-05-09 NOTE — PHYSICAL EXAM
[Ankle Swelling (On Exam)] : not present [Varicose Veins Of Lower Extremities] : not present [] : not present [Abdomen Tenderness] : ~T ~M No abdominal tenderness [Alert] : alert [Calm] : calm [de-identified] : Alert and oriented no acute distress [de-identified] : Head and neck within normal limit [de-identified] : Breathing comfortably on room air [de-identified] : Regular rate and rhythm [FreeTextEntry1] : Palpable bilateral radial popliteal and pedal pulses

## 2023-05-09 NOTE — HISTORY OF PRESENT ILLNESS
[FreeTextEntry1] : 48-year-old female presented for evaluation of bilateral lower extremity leg swelling and pain.  She states that she underwent bilateral lower extremity venous ablation and outside facility.  Complains of left thigh bulging vein.  Denies any claudication.  Complains of bilateral lower extremity leg swelling as well.

## 2023-05-26 ENCOUNTER — APPOINTMENT (OUTPATIENT)
Dept: INTERNAL MEDICINE | Facility: CLINIC | Age: 48
End: 2023-05-26

## 2023-09-25 ENCOUNTER — APPOINTMENT (OUTPATIENT)
Dept: INTERNAL MEDICINE | Facility: CLINIC | Age: 48
End: 2023-09-25

## 2023-09-26 ENCOUNTER — APPOINTMENT (OUTPATIENT)
Dept: INTERNAL MEDICINE | Facility: CLINIC | Age: 48
End: 2023-09-26
Payer: MEDICAID

## 2023-09-26 ENCOUNTER — OUTPATIENT (OUTPATIENT)
Dept: OUTPATIENT SERVICES | Facility: HOSPITAL | Age: 48
LOS: 1 days | End: 2023-09-26

## 2023-09-26 VITALS
HEIGHT: 63 IN | OXYGEN SATURATION: 98 % | SYSTOLIC BLOOD PRESSURE: 100 MMHG | BODY MASS INDEX: 35.08 KG/M2 | DIASTOLIC BLOOD PRESSURE: 70 MMHG | HEART RATE: 78 BPM | WEIGHT: 198 LBS

## 2023-09-26 DIAGNOSIS — Z02.89 ENCOUNTER FOR OTHER ADMINISTRATIVE EXAMINATIONS: ICD-10-CM

## 2023-09-26 DIAGNOSIS — I83.819 VARICOSE VEINS OF UNSPECIFIED LOWER EXTREMITY WITH PAIN: ICD-10-CM

## 2023-09-26 DIAGNOSIS — I83.90 ASYMPTOMATIC VARICOSE VEINS OF UNSPECIFIED LOWER EXTREMITY: Chronic | ICD-10-CM

## 2023-09-26 DIAGNOSIS — Z00.00 ENCOUNTER FOR GENERAL ADULT MEDICAL EXAMINATION W/OUT ABNORMAL FINDINGS: ICD-10-CM

## 2023-09-26 DIAGNOSIS — Z78.9 OTHER SPECIFIED HEALTH STATUS: ICD-10-CM

## 2023-09-26 PROCEDURE — 99396 PREV VISIT EST AGE 40-64: CPT

## 2023-09-26 PROCEDURE — ZZZZZ: CPT

## 2023-09-26 RX ORDER — BENZONATATE 200 MG/1
200 CAPSULE ORAL 3 TIMES DAILY
Qty: 15 | Refills: 0 | Status: DISCONTINUED | COMMUNITY
Start: 2023-02-16 | End: 2023-09-26

## 2023-09-26 RX ORDER — ZOSTER VACCINE RECOMBINANT, ADJUVANTED 50 MCG/0.5
50 KIT INTRAMUSCULAR
Qty: 1 | Refills: 0 | Status: ACTIVE | COMMUNITY
Start: 2023-09-26 | End: 1900-01-01

## 2023-09-26 RX ORDER — AMOXICILLIN AND CLAVULANATE POTASSIUM 875; 125 MG/1; MG/1
875-125 TABLET, COATED ORAL
Qty: 20 | Refills: 0 | Status: DISCONTINUED | COMMUNITY
Start: 2023-02-16 | End: 2023-09-26

## 2023-09-26 RX ORDER — METHYLPREDNISOLONE 4 MG/1
4 TABLET ORAL
Qty: 1 | Refills: 0 | Status: DISCONTINUED | COMMUNITY
Start: 2023-04-21 | End: 2023-09-26

## 2023-09-27 LAB
25(OH)D3 SERPL-MCNC: 22.6 NG/ML
ALBUMIN SERPL ELPH-MCNC: 4.2 G/DL
ALP BLD-CCNC: 88 U/L
ALT SERPL-CCNC: 15 U/L
ANION GAP SERPL CALC-SCNC: 12 MMOL/L
AST SERPL-CCNC: 21 U/L
BASOPHILS # BLD AUTO: 0.02 K/UL
BASOPHILS NFR BLD AUTO: 0.5 %
BILIRUB SERPL-MCNC: 0.3 MG/DL
BUN SERPL-MCNC: 13 MG/DL
C TRACH RRNA SPEC QL NAA+PROBE: NOT DETECTED
CALCIUM SERPL-MCNC: 8.7 MG/DL
CHLORIDE SERPL-SCNC: 106 MMOL/L
CHOLEST SERPL-MCNC: 235 MG/DL
CO2 SERPL-SCNC: 22 MMOL/L
CREAT SERPL-MCNC: 0.51 MG/DL
DEPRECATED O AND P PREP STL: NORMAL
EGFR: 114 ML/MIN/1.73M2
EOSINOPHIL # BLD AUTO: 0.26 K/UL
EOSINOPHIL NFR BLD AUTO: 5.9 %
ESTIMATED AVERAGE GLUCOSE: 117 MG/DL
FERRITIN SERPL-MCNC: 11 NG/ML
FOLATE SERPL-MCNC: 15.7 NG/ML
GLUCOSE SERPL-MCNC: 86 MG/DL
HBA1C MFR BLD HPLC: 5.7 %
HBV CORE IGG+IGM SER QL: NONREACTIVE
HBV SURFACE AB SERPL IA-ACNC: 500.3 MIU/ML
HBV SURFACE AG SER QL: NONREACTIVE
HCT VFR BLD CALC: 37.9 %
HCV AB SER QL: NONREACTIVE
HCV S/CO RATIO: 0.08 S/CO
HDLC SERPL-MCNC: 67 MG/DL
HGB BLD-MCNC: 12.2 G/DL
HIV1+2 AB SPEC QL IA.RAPID: NONREACTIVE
IMM GRANULOCYTES NFR BLD AUTO: 0 %
IRON SATN MFR SERPL: 8 %
IRON SERPL-MCNC: 33 UG/DL
LDLC SERPL CALC-MCNC: 152 MG/DL
LYMPHOCYTES # BLD AUTO: 2.02 K/UL
LYMPHOCYTES NFR BLD AUTO: 45.6 %
MAN DIFF?: NORMAL
MCHC RBC-ENTMCNC: 27.4 PG
MCHC RBC-ENTMCNC: 32.2 GM/DL
MCV RBC AUTO: 85.2 FL
MEV IGG FLD QL IA: 229 AU/ML
MEV IGG+IGM SER-IMP: POSITIVE
MONOCYTES # BLD AUTO: 0.32 K/UL
MONOCYTES NFR BLD AUTO: 7.2 %
MUV AB SER-ACNC: POSITIVE
MUV IGG SER QL IA: 75.4 AU/ML
N GONORRHOEA RRNA SPEC QL NAA+PROBE: NOT DETECTED
NEUTROPHILS # BLD AUTO: 1.81 K/UL
NEUTROPHILS NFR BLD AUTO: 40.8 %
NONHDLC SERPL-MCNC: 168 MG/DL
PLATELET # BLD AUTO: 316 K/UL
POTASSIUM SERPL-SCNC: 4.2 MMOL/L
PROT SERPL-MCNC: 7.2 G/DL
RBC # BLD: 4.45 M/UL
RBC # BLD: 4.45 M/UL
RBC # FLD: 14.9 %
RETICS # AUTO: 1 %
RETICS AGGREG/RBC NFR: 42.7 K/UL
RUBV IGG FLD-ACNC: 3.3 INDEX
RUBV IGG SER-IMP: POSITIVE
SODIUM SERPL-SCNC: 140 MMOL/L
SOURCE AMPLIFICATION: NORMAL
T PALLIDUM AB SER QL IA: NEGATIVE
TIBC SERPL-MCNC: 421 UG/DL
TRANSFERRIN SERPL-MCNC: 324 MG/DL
TRIGL SERPL-MCNC: 93 MG/DL
UIBC SERPL-MCNC: 387 UG/DL
VIT B12 SERPL-MCNC: 807 PG/ML
WBC # FLD AUTO: 4.43 K/UL

## 2023-09-27 NOTE — ED ADULT NURSE NOTE - MODE OF DISCHARGE
Education Record    Learner:  Patient    Disease / Diagnosis: anemia    Barriers / Limitations:  None   Comments:    Method:  Discussion   Comments:    General Topics:  Medication and Plan of care reviewed   Comments:    Outcome:  Shows understanding   Comments:    Feraheme given. VS stable 30 min post infusion.
Ambulatory

## 2023-09-28 ENCOUNTER — NON-APPOINTMENT (OUTPATIENT)
Age: 48
End: 2023-09-28

## 2023-09-28 LAB
M TB IFN-G BLD-IMP: NEGATIVE
QUANTIFERON TB PLUS MITOGEN MINUS NIL: 8.63 IU/ML
QUANTIFERON TB PLUS NIL: 0.05 IU/ML
QUANTIFERON TB PLUS TB1 MINUS NIL: 0 IU/ML
QUANTIFERON TB PLUS TB2 MINUS NIL: 0.02 IU/ML

## 2023-09-29 DIAGNOSIS — K59.00 CONSTIPATION, UNSPECIFIED: ICD-10-CM

## 2023-09-29 DIAGNOSIS — Z00.00 ENCOUNTER FOR GENERAL ADULT MEDICAL EXAMINATION WITHOUT ABNORMAL FINDINGS: ICD-10-CM

## 2023-09-29 DIAGNOSIS — E61.1 IRON DEFICIENCY: ICD-10-CM

## 2023-09-29 DIAGNOSIS — M54.9 DORSALGIA, UNSPECIFIED: ICD-10-CM

## 2023-09-29 DIAGNOSIS — K29.70 GASTRITIS, UNSPECIFIED, WITHOUT BLEEDING: ICD-10-CM

## 2023-09-29 DIAGNOSIS — I83.819 VARICOSE VEINS OF UNSPECIFIED LOWER EXTREMITY WITH PAIN: ICD-10-CM

## 2023-10-19 ENCOUNTER — APPOINTMENT (OUTPATIENT)
Dept: VASCULAR SURGERY | Facility: CLINIC | Age: 48
End: 2023-10-19
Payer: MEDICAID

## 2023-10-19 VITALS
DIASTOLIC BLOOD PRESSURE: 70 MMHG | TEMPERATURE: 99.1 F | SYSTOLIC BLOOD PRESSURE: 106 MMHG | BODY MASS INDEX: 35.08 KG/M2 | HEART RATE: 71 BPM | WEIGHT: 198 LBS | HEIGHT: 63 IN

## 2023-10-19 DIAGNOSIS — I73.9 PERIPHERAL VASCULAR DISEASE, UNSPECIFIED: ICD-10-CM

## 2023-10-19 PROCEDURE — 99213 OFFICE O/P EST LOW 20 MIN: CPT

## 2023-10-19 PROCEDURE — 93923 UPR/LXTR ART STDY 3+ LVLS: CPT

## 2023-10-24 NOTE — ED ADULT TRIAGE NOTE - WEIGHT METHOD
Quality 474: Zoster Vaccination Status: Shingrix Vaccination not Administered or Previously Received, Reason not Otherwise Specified Detail Level: Detailed Quality 130: Documentation Of Current Medications In The Medical Record: Current Medications Documented Quality 131: Pain Assessment And Follow-Up: Pain assessment documented as positive using a standardized tool AND a follow-up plan is documented stated Sarecycline Counseling: Patient advised regarding possible photosensitivity and discoloration of the teeth, skin, lips, tongue and gums.  Patient instructed to avoid sunlight, if possible.  When exposed to sunlight, patients should wear protective clothing, sunglasses, and sunscreen.  The patient was instructed to call the office immediately if the following severe adverse effects occur:  hearing changes, easy bruising/bleeding, severe headache, or vision changes.  The patient verbalized understanding of the proper use and possible adverse effects of sarecycline.  All of the patient's questions and concerns were addressed.

## 2023-11-14 RX ORDER — AMITRIPTYLINE HYDROCHLORIDE 10 MG/1
10 TABLET, FILM COATED ORAL
Qty: 90 | Refills: 2 | Status: ACTIVE | COMMUNITY
Start: 2022-08-16 | End: 1900-01-01

## 2023-12-28 ENCOUNTER — APPOINTMENT (OUTPATIENT)
Dept: INTERNAL MEDICINE | Facility: CLINIC | Age: 48
End: 2023-12-28

## 2024-01-09 ENCOUNTER — OUTPATIENT (OUTPATIENT)
Dept: OUTPATIENT SERVICES | Facility: HOSPITAL | Age: 49
LOS: 1 days | End: 2024-01-09

## 2024-01-09 ENCOUNTER — APPOINTMENT (OUTPATIENT)
Dept: INTERNAL MEDICINE | Facility: CLINIC | Age: 49
End: 2024-01-09
Payer: MEDICAID

## 2024-01-09 VITALS
HEIGHT: 63 IN | DIASTOLIC BLOOD PRESSURE: 69 MMHG | OXYGEN SATURATION: 98 % | HEART RATE: 72 BPM | SYSTOLIC BLOOD PRESSURE: 115 MMHG | WEIGHT: 203 LBS | BODY MASS INDEX: 35.97 KG/M2

## 2024-01-09 DIAGNOSIS — M54.9 DORSALGIA, UNSPECIFIED: ICD-10-CM

## 2024-01-09 DIAGNOSIS — I83.90 ASYMPTOMATIC VARICOSE VEINS OF UNSPECIFIED LOWER EXTREMITY: Chronic | ICD-10-CM

## 2024-01-09 PROBLEM — M25.50 PAIN IN UNSPECIFIED JOINT: Chronic | Status: ACTIVE | Noted: 2022-07-27

## 2024-01-09 PROCEDURE — 99214 OFFICE O/P EST MOD 30 MIN: CPT

## 2024-01-09 RX ORDER — LIDOCAINE 5% 700 MG/1
5 PATCH TOPICAL
Qty: 1 | Refills: 0 | Status: ACTIVE | COMMUNITY
Start: 2024-01-09 | End: 1900-01-01

## 2024-01-09 RX ORDER — TIZANIDINE HYDROCHLORIDE 2 MG/1
2 CAPSULE ORAL
Qty: 21 | Refills: 0 | Status: ACTIVE | COMMUNITY
Start: 2024-01-09 | End: 1900-01-01

## 2024-01-09 RX ORDER — ZONISAMIDE 100 MG/1
100 CAPSULE ORAL
Qty: 30 | Refills: 4 | Status: ACTIVE | COMMUNITY
Start: 2022-11-04 | End: 1900-01-01

## 2024-01-09 RX ORDER — POLYETHYLENE GLYCOL 3350 17 G/17G
17 POWDER, FOR SOLUTION ORAL DAILY
Qty: 30 | Refills: 2 | Status: ACTIVE | COMMUNITY
Start: 2023-01-06 | End: 1900-01-01

## 2024-01-09 RX ORDER — SENNOSIDES 8.6 MG TABLETS 8.6 MG/1
8.6 TABLET ORAL
Qty: 60 | Refills: 1 | Status: ACTIVE | COMMUNITY
Start: 2023-09-26 | End: 1900-01-01

## 2024-01-09 NOTE — INTERPRETER SERVICES
[Patient Declined  Services] : - None: Patient declined  services [FreeTextEntry3] : Pt prefers to speak w provider in [place]ian  [TWNoteComboBox1] : [place]

## 2024-01-09 NOTE — PHYSICAL EXAM
[Normal Sclera/Conjunctiva] : normal sclera/conjunctiva [Normal Outer Ear/Nose] : the outer ears and nose were normal in appearance [Supple] : supple [No Respiratory Distress] : no respiratory distress  [No Accessory Muscle Use] : no accessory muscle use [Clear to Auscultation] : lungs were clear to auscultation bilaterally [Normal Rate] : normal rate  [Regular Rhythm] : with a regular rhythm [Normal S1, S2] : normal S1 and S2 [No Edema] : there was no peripheral edema [Soft] : abdomen soft [Non Tender] : non-tender [Non-distended] : non-distended [Normal Bowel Sounds] : normal bowel sounds [Grossly Normal Strength/Tone] : grossly normal strength/tone [No Focal Deficits] : no focal deficits [Normal Affect] : the affect was normal [Normal Insight/Judgement] : insight and judgment were intact [de-identified] : appears uncomfortable [de-identified] : neg straight leg test

## 2024-01-09 NOTE — HISTORY OF PRESENT ILLNESS
[FreeTextEntry1] : f/u [de-identified] : Patient is a 49 year old F with hx of anemia, varicose veins, venous ablation and back pain coming in for an acute visit regarding back pain.  Pt reports low back pain that radiates up wards. Pain is 10/10. Has issues staying still bc of back pain. Cannot tie shoes do to severe pain. Denies incontinence/saddle anesthesia/ no leg weakness. Pt has been taking diclofenac and Tylenol which helps. Pt has had PT for this concern with her chiropractor, but it has not helped. Pt was referred to ortho but she never went. Pt is also requesting rheumatology referral. Advised pt to go to ED due to severe pain but she declines.

## 2024-01-09 NOTE — DATA REVIEWED
[FreeTextEntry1] :  ACC: [id number] EXAM:  MR SPINE CERVICAL WAW IC                        PROCEDURE DATE:  06/29/2022      INTERPRETATION:  CLINICAL INDICATION: Cervical radiculopathy.  TECHNIQUE: Multiplanar multisequence MRI of the cervical spine was  performed before and after the intravenous administration of contrast,  according to standard protocol. 7.5 ml of Gadavist IV contrast was  administered.  COMPARISON: None available.  FINDINGS:  ALIGNMENT:  The alignment is normal.  VERTEBRAE: The vertebral bodies are normal in height. No acute fracture  or aggressive osseous lesion.  DISCS: Multilevel disc desiccation.  CORD: There is no intrinsic spinal cord signal abnormality.  ENHANCEMENT: No abnormal enhancement identified.  PARAVERTEBRAL SOFT TISSUES: The visualized paravertebral soft tissues  appear within normal limits.  EVALUATION OF INDIVIDUAL LEVELS: C2-3: No disc herniation, spinal canal or neuroforaminal stenosis. C3-4: No disc herniation, spinal canal or neuroforaminal stenosis. C4-5: No disc herniation, spinal canal or neuroforaminal stenosis. C5-6: Mild disc bulge. No spinal canal or neuroforaminal stenosis. C6-7: No disc herniation, spinal canal or neuroforaminal stenosis. C7-T1: No disc herniation, spinal canal or neuroforaminal stenosis.  Other: Partially visualized expanded and partially empty sella.   ACC: [id number] EXAM:  MR SPINE LUMBAR WAW IC                        PROCEDURE DATE:  06/29/2022      INTERPRETATION:  CLINICAL INDICATION: Severe back pain. Status post heat  ablation for varicose veins.  TECHNIQUE: Multiplanar multisequence MRI of the lumbar spine was  performed before and after the intravenous administration of contrast  according to standard protocol. 7.5 ml of Gadavist IV contrast was  administered.  COMPARISON: Lumbar spine radiograph 6/26/2022.  FINDINGS:  ALIGNMENT:  The alignment is normal.  VERTEBRAE: The vertebral bodies are normal in height. There is no  fracture or aggressive osseous lesion. Mild multilevel degenerative  changes.  DISCS: Multilevel disc desiccation and loss of height.  CONUS MEDULLARIS AND CAUDA EQUINA: The conus medullaris terminates at  T12-L1. There is normal appearance of the conus medullaris and cauda  equina.  EVALUATION OF INDIVIDUAL LEVELS: L1-2: No disc herniation, spinal canal or neuroforaminal stenosis.  L2-3: Mild disc bulge. No spinal canal or neuroforaminal stenosis.  L3-4: Mild disc bulge. No spinal canal or neuroforaminal stenosis.  L4-5: Disc bulge with central annular fissure and superimposed shallow  central disc protrusion. No spinal canal or neuroforaminal stenosis.  L5-S1: No disc herniation, spinal canal or neuroforaminal stenosis.  LIMITED EVALUATION OF UPPER SACRUM AND SACROILIAC JOINTS: Unremarkable.  PARAVERTEBRAL SOFT TISSUES AND VISUALIZED RETROPERITONEUM: The visualized  paravertebral soft tissues appear within normal limits.  IMPRESSION:  Mild multilevel degenerative changes. No spinal canal or neuroforaminal  stenosis. Normal appearance of the conus medullaris and cauda equina.  --- End of Report ---   SHLOMO MARROQUIN MD; Attending Radiologist This document has been electronically signed. Jun 30 2022  8:59AM

## 2024-01-09 NOTE — PLAN
[FreeTextEntry1] : Patient is a 49 year old F coming in for a follow up/acute visit  #back pain - discussed red flags of back pain w pt, advised pt to go to ED given severity of pain but pt declines - ortho referral provided, pt also requesting rheum referral which is provided -pt says she has failed multiple sessions of PT - advised against NSAID use given hx of gastritis, short course muscle relaxant sent/ lidocain patches -cont w conservative measures, light stretching, massage, warm compresses   #hx of anemia -s/p EGD and colonoscopy- no evidence of bleeding -pt reports taking iron-- will obtain iron panel/CBC (ordered) - advised to f/u with GYN and GI  #reflux - had EGD showing gastritis in 2022 -endorses reflux symps are stable at this time on famotidine 20mg BID PRN -advised to cont f/u with GI   #constipation - on metamucil, MiraLAX/ senna -GI referral sent - discussed adequate hydration and vegetable intake   #HA - on amitriptyline  - cont f/u w/ neurology  #HCM - advised to obtain medical records/ vaccination records  -endorses getting mammo and pap-- advised to obtain records   - up to date with colonoscopy 2022   f/u in 2-4 wks

## 2024-01-10 ENCOUNTER — OUTPATIENT (OUTPATIENT)
Dept: OUTPATIENT SERVICES | Facility: HOSPITAL | Age: 49
LOS: 1 days | End: 2024-01-10

## 2024-01-10 ENCOUNTER — APPOINTMENT (OUTPATIENT)
Dept: INTERNAL MEDICINE | Facility: CLINIC | Age: 49
End: 2024-01-10

## 2024-01-10 DIAGNOSIS — E61.1 IRON DEFICIENCY: ICD-10-CM

## 2024-01-10 DIAGNOSIS — K29.70 GASTRITIS, UNSPECIFIED, WITHOUT BLEEDING: ICD-10-CM

## 2024-01-10 DIAGNOSIS — K59.00 CONSTIPATION, UNSPECIFIED: ICD-10-CM

## 2024-01-10 DIAGNOSIS — M54.9 DORSALGIA, UNSPECIFIED: ICD-10-CM

## 2024-01-10 DIAGNOSIS — I83.90 ASYMPTOMATIC VARICOSE VEINS OF UNSPECIFIED LOWER EXTREMITY: Chronic | ICD-10-CM

## 2024-01-11 LAB
25(OH)D3 SERPL-MCNC: 17.7 NG/ML
FERRITIN SERPL-MCNC: 7 NG/ML
FOLATE SERPL-MCNC: 12.4 NG/ML
HCT VFR BLD CALC: 38.3 %
HGB BLD-MCNC: 12.1 G/DL
IRON SATN MFR SERPL: 7 %
IRON SERPL-MCNC: 30 UG/DL
MCHC RBC-ENTMCNC: 26.4 PG
MCHC RBC-ENTMCNC: 31.6 GM/DL
MCV RBC AUTO: 83.4 FL
PLATELET # BLD AUTO: 305 K/UL
RBC # BLD: 4.59 M/UL
RBC # BLD: 4.59 M/UL
RBC # FLD: 15.7 %
RETICS # AUTO: 1 %
RETICS AGGREG/RBC NFR: 46.8 K/UL
TIBC SERPL-MCNC: 405 UG/DL
UIBC SERPL-MCNC: 375 UG/DL
VIT B12 SERPL-MCNC: 771 PG/ML
WBC # FLD AUTO: 6.28 K/UL

## 2024-01-11 RX ORDER — CHLORHEXIDINE GLUCONATE 4 %
325 (65 FE) LIQUID (ML) TOPICAL
Qty: 45 | Refills: 0 | Status: ACTIVE | COMMUNITY
Start: 2022-11-16 | End: 1900-01-01

## 2024-01-11 RX ORDER — ASCORBIC ACID 250 MG
250 TABLET,CHEWABLE ORAL
Qty: 45 | Refills: 0 | Status: ACTIVE | COMMUNITY
Start: 2022-11-16 | End: 1900-01-01

## 2024-01-16 DIAGNOSIS — D64.9 ANEMIA, UNSPECIFIED: ICD-10-CM

## 2024-01-25 ENCOUNTER — OUTPATIENT (OUTPATIENT)
Dept: OUTPATIENT SERVICES | Facility: HOSPITAL | Age: 49
LOS: 1 days | End: 2024-01-25

## 2024-01-25 ENCOUNTER — APPOINTMENT (OUTPATIENT)
Dept: GASTROENTEROLOGY | Facility: CLINIC | Age: 49
End: 2024-01-25
Payer: MEDICAID

## 2024-01-25 VITALS
DIASTOLIC BLOOD PRESSURE: 80 MMHG | BODY MASS INDEX: 36.68 KG/M2 | HEIGHT: 63 IN | SYSTOLIC BLOOD PRESSURE: 113 MMHG | RESPIRATION RATE: 16 BRPM | WEIGHT: 207 LBS | OXYGEN SATURATION: 100 % | HEART RATE: 68 BPM | TEMPERATURE: 98.2 F

## 2024-01-25 DIAGNOSIS — Z78.9 OTHER SPECIFIED HEALTH STATUS: ICD-10-CM

## 2024-01-25 DIAGNOSIS — I83.90 ASYMPTOMATIC VARICOSE VEINS OF UNSPECIFIED LOWER EXTREMITY: Chronic | ICD-10-CM

## 2024-01-25 DIAGNOSIS — R12 HEARTBURN: ICD-10-CM

## 2024-01-25 DIAGNOSIS — E61.1 IRON DEFICIENCY: ICD-10-CM

## 2024-01-25 PROCEDURE — 99214 OFFICE O/P EST MOD 30 MIN: CPT | Mod: GC

## 2024-01-25 RX ORDER — OMEPRAZOLE 40 MG/1
40 CAPSULE, DELAYED RELEASE ORAL
Qty: 1 | Refills: 3 | Status: ACTIVE | COMMUNITY
Start: 2024-01-25 | End: 1900-01-01

## 2024-01-29 DIAGNOSIS — E61.1 IRON DEFICIENCY: ICD-10-CM

## 2024-01-29 DIAGNOSIS — R12 HEARTBURN: ICD-10-CM

## 2024-01-29 DIAGNOSIS — K29.70 GASTRITIS, UNSPECIFIED, WITHOUT BLEEDING: ICD-10-CM

## 2024-01-29 DIAGNOSIS — K59.00 CONSTIPATION, UNSPECIFIED: ICD-10-CM

## 2024-01-29 PROBLEM — Z78.9 NO FAMILY HISTORY OF COLORECTAL CANCER: Status: ACTIVE | Noted: 2024-01-29

## 2024-01-29 NOTE — ASSESSMENT
[FreeTextEntry1] : #Iron deficiency: -Patient with no overt GIB bleeding and negative EGD/Colonoscopy with no evidence of HP.  -DDx includes Celiac disease vs parasitic infection vs small bowel tumor (GIST, adenocarcinoma) or angioectasia.  It is unlikely to be GI telangiectasias given no risk factor w/o evidence of overt GIB OR autoimmune gastritis given normal B12 lvl)  #Heartburn: ongoing despite diet modification. Plan to start PPI  #Constipation  Plan: -send TTG IgA and total IgA to rule out Celiac disease -send anti-parietal cell and intrinsic factor ab (unlikely given normal B12 levels and no macrocytosis or anemia) -send O&P to for parasitic infection (will need total of three) -strongyloidiasis ab -CTE to rule out small bowel tumor/mass -Can consider IV iron supplementation -If refractory to IV supplementation and work up unrevealing, will plan to discuss VCE.  -PPI daily for heartburn -Psyllium fiber BID. Can add MiraLAX if still constipated. - consider video capsule endoscopy if above is negative  -RTC in 6 months  Spencer Arnold MD GI Fellow

## 2024-01-29 NOTE — HISTORY OF PRESENT ILLNESS
[FreeTextEntry1] : 50 yo with HELENE present to GI clinic for further evaluation of her anemia.  Patient last seen on August 2022 for HELENE. At that time, she was diagnosed with HELENE and was started on iron pills. At that time, patient did not have any overt GIB. She underwent EGD/Colonoscopy that showed gastritis (bx negative for HP or IM) and normal colonoscopy.  Duodenal biopsies not obrained.  Patient is now back to GI clinic given ongoing iron deficiency despite PO supplementation.   Only complain is daily heartburn and constipation. No additional concern/complain.   No family history of liver/stomach/pancreatic/colon cancer.

## 2024-01-29 NOTE — END OF VISIT
[] : Fellow [FreeTextEntry3] : As modified and discussed with patient MD MICK Kohli [facility] Associate Professor of Medicine [facility]

## 2024-02-07 ENCOUNTER — APPOINTMENT (OUTPATIENT)
Dept: ORTHOPEDIC SURGERY | Facility: HOSPITAL | Age: 49
End: 2024-02-07

## 2024-04-01 ENCOUNTER — APPOINTMENT (OUTPATIENT)
Dept: RHEUMATOLOGY | Facility: CLINIC | Age: 49
End: 2024-04-01

## 2024-04-19 LAB
IF BLOCK AB SER QL: 1.1 AU/ML
IGA SER QL IEP: 180 MG/DL
PCA AB SER QL IF: NORMAL
STRONGYLOIDES AB SER IA-ACNC: NEGATIVE
TTG IGA SER IA-ACNC: <1.2 U/ML
TTG IGA SER-ACNC: NEGATIVE

## 2024-05-30 ENCOUNTER — OUTPATIENT (OUTPATIENT)
Dept: OUTPATIENT SERVICES | Facility: HOSPITAL | Age: 49
LOS: 1 days | End: 2024-05-30

## 2024-05-30 ENCOUNTER — APPOINTMENT (OUTPATIENT)
Dept: GASTROENTEROLOGY | Facility: CLINIC | Age: 49
End: 2024-05-30
Payer: COMMERCIAL

## 2024-05-30 VITALS
OXYGEN SATURATION: 97 % | WEIGHT: 213 LBS | BODY MASS INDEX: 37.74 KG/M2 | SYSTOLIC BLOOD PRESSURE: 116 MMHG | HEART RATE: 68 BPM | HEIGHT: 63 IN | DIASTOLIC BLOOD PRESSURE: 60 MMHG

## 2024-05-30 DIAGNOSIS — I83.90 ASYMPTOMATIC VARICOSE VEINS OF UNSPECIFIED LOWER EXTREMITY: Chronic | ICD-10-CM

## 2024-05-30 DIAGNOSIS — K29.70 GASTRITIS, UNSPECIFIED, W/OUT BLEEDING: ICD-10-CM

## 2024-05-30 DIAGNOSIS — D50.9 IRON DEFICIENCY ANEMIA, UNSPECIFIED: ICD-10-CM

## 2024-05-30 DIAGNOSIS — R10.13 EPIGASTRIC PAIN: ICD-10-CM

## 2024-05-30 DIAGNOSIS — K59.00 CONSTIPATION, UNSPECIFIED: ICD-10-CM

## 2024-05-30 PROCEDURE — 99214 OFFICE O/P EST MOD 30 MIN: CPT | Mod: GC,25

## 2024-05-30 RX ORDER — LUBIPROSTONE 24 UG/1
24 CAPSULE ORAL TWICE DAILY
Qty: 30 | Refills: 2 | Status: ACTIVE | COMMUNITY
Start: 2024-05-30 | End: 1900-01-01

## 2024-05-30 NOTE — REVIEW OF SYSTEMS
[Abdominal Pain] : abdominal pain [Constipation] : constipation [Bloating (gassiness)] : bloating [Fever] : no fever [Chills] : no chills [Recent Weight Loss (___ Lbs)] : no recent weight loss [Shortness Of Breath] : no shortness of breath [Vomiting] : no vomiting [Diarrhea] : no diarrhea [Heartburn] : no heartburn [Melena (black stool)] : no melena [Bleeding] : no bleeding

## 2024-05-30 NOTE — PHYSICAL EXAM
[Alert] : alert [Healthy Appearing] : healthy appearing [No Respiratory Distress] : no respiratory distress [No Acc Muscle Use] : no accessory muscle use [Abdomen Soft] : soft [Oriented To Time, Place, And Person] : oriented to person, place, and time [de-identified] : obese female [de-identified] : Mild tenderness in the epigastric and RUQ region

## 2024-05-30 NOTE — REASON FOR VISIT
[Follow-up] : a follow-up of an existing diagnosis [Spouse] : spouse [FreeTextEntry1] : HELENE and constipation

## 2024-05-30 NOTE — ASSESSMENT
[FreeTextEntry1] : Impression:  50 yo with HELENE present to GI clinic for anemia and constipation.   #Chronic constipation ongoing for one year, despite Miralax BID and Metamucil. Underwent colonoscopy which showed diverticulosis, otherwise normal.  - Will trial lubiprostone - if approved by insurance.   #HELENE - Underwent both EGD and colonoscopy which were normal. anti-TTG, IgA were neg. Strongyloides ab was neg. IF ab and parietal cell Ab were neg as well.  - Last iron panel showed low iron sat 7% with high TIBC and low ferritin 7. However, reported she had iron studies last week which were normal, no records here.  - Will repeat iron studies, if still low, will consider VCE for small bowel etiology for HELENE.   Recommendations:  - Ordered iron studies and ferritin levels.  - Start lubiprostone 24 mg BID  - Continue with Miralax BID.  - Follow up in 3 months.   DIscussed the case with Dr. Tolentino.

## 2024-05-30 NOTE — END OF VISIT
[] : Fellow [FreeTextEntry3] : As modified and discussed with patient MD MICK Kohli [facility] Associate Professor of Medicine [facility]

## 2024-05-30 NOTE — HISTORY OF PRESENT ILLNESS
[de-identified] : 8/22 gastritis, no  h pylori or IM  [FreeTextEntry1] : 8/22 - diverticulosis, no bleeding, no polyps were removed.

## 2024-05-31 ENCOUNTER — NON-APPOINTMENT (OUTPATIENT)
Age: 49
End: 2024-05-31

## 2024-05-31 DIAGNOSIS — R10.13 EPIGASTRIC PAIN: ICD-10-CM

## 2024-05-31 DIAGNOSIS — K59.00 CONSTIPATION, UNSPECIFIED: ICD-10-CM

## 2024-05-31 DIAGNOSIS — D50.9 IRON DEFICIENCY ANEMIA, UNSPECIFIED: ICD-10-CM

## 2024-05-31 DIAGNOSIS — K29.70 GASTRITIS, UNSPECIFIED, WITHOUT BLEEDING: ICD-10-CM

## 2024-05-31 LAB
FERRITIN SERPL-MCNC: 8 NG/ML
IRON SATN MFR SERPL: 9 %
IRON SERPL-MCNC: 44 UG/DL
TIBC SERPL-MCNC: 480 UG/DL
UIBC SERPL-MCNC: 437 UG/DL

## 2024-07-18 ENCOUNTER — OUTPATIENT (OUTPATIENT)
Dept: OUTPATIENT SERVICES | Facility: HOSPITAL | Age: 49
LOS: 1 days | End: 2024-07-18

## 2024-07-18 ENCOUNTER — APPOINTMENT (OUTPATIENT)
Dept: GASTROENTEROLOGY | Facility: CLINIC | Age: 49
End: 2024-07-18
Payer: COMMERCIAL

## 2024-07-18 VITALS
DIASTOLIC BLOOD PRESSURE: 72 MMHG | HEART RATE: 70 BPM | TEMPERATURE: 98.1 F | OXYGEN SATURATION: 98 % | HEIGHT: 63 IN | BODY MASS INDEX: 38.45 KG/M2 | RESPIRATION RATE: 16 BRPM | SYSTOLIC BLOOD PRESSURE: 103 MMHG | WEIGHT: 217 LBS

## 2024-07-18 DIAGNOSIS — K59.00 CONSTIPATION, UNSPECIFIED: ICD-10-CM

## 2024-07-18 DIAGNOSIS — I83.90 ASYMPTOMATIC VARICOSE VEINS OF UNSPECIFIED LOWER EXTREMITY: Chronic | ICD-10-CM

## 2024-07-18 DIAGNOSIS — D50.9 IRON DEFICIENCY ANEMIA, UNSPECIFIED: ICD-10-CM

## 2024-07-18 DIAGNOSIS — R10.13 EPIGASTRIC PAIN: ICD-10-CM

## 2024-07-18 PROCEDURE — 99214 OFFICE O/P EST MOD 30 MIN: CPT

## 2024-07-18 RX ORDER — SIMETHICONE 125 MG/1
125 TABLET, CHEWABLE ORAL
Qty: 2 | Refills: 0 | Status: ACTIVE | COMMUNITY
Start: 2024-07-18 | End: 1900-01-01

## 2024-07-18 RX ORDER — PSYLLIUM HUSK (WITH SUGAR) 3 G/7 G
43 POWDER (GRAM) ORAL DAILY
Qty: 1 | Refills: 0 | Status: ACTIVE | COMMUNITY
Start: 2024-07-18 | End: 1900-01-01

## 2024-07-18 RX ORDER — POLYETHYLENE GLYCOL 3350 17 G/17G
17 POWDER, FOR SOLUTION ORAL ONCE
Qty: 68 | Refills: 0 | Status: ACTIVE | COMMUNITY
Start: 2024-07-18 | End: 1900-01-01

## 2024-07-18 RX ORDER — POLYETHYLENE GLYCOL 3350 17 G
17 POWDER IN PACKET (EA) ORAL DAILY
Qty: 2 | Refills: 3 | Status: ACTIVE | COMMUNITY
Start: 2024-07-18 | End: 1900-01-01

## 2024-07-24 DIAGNOSIS — D50.9 IRON DEFICIENCY ANEMIA, UNSPECIFIED: ICD-10-CM

## 2024-07-24 DIAGNOSIS — R10.13 EPIGASTRIC PAIN: ICD-10-CM

## 2024-07-24 DIAGNOSIS — K59.00 CONSTIPATION, UNSPECIFIED: ICD-10-CM

## 2024-08-21 ENCOUNTER — NON-APPOINTMENT (OUTPATIENT)
Age: 49
End: 2024-08-21

## 2024-08-22 ENCOUNTER — APPOINTMENT (OUTPATIENT)
Dept: GASTROENTEROLOGY | Facility: CLINIC | Age: 49
End: 2024-08-22

## 2024-09-04 ENCOUNTER — APPOINTMENT (OUTPATIENT)
Dept: PAIN MANAGEMENT | Facility: CLINIC | Age: 49
End: 2024-09-04

## 2024-09-04 VITALS
HEIGHT: 63 IN | WEIGHT: 217 LBS | DIASTOLIC BLOOD PRESSURE: 76 MMHG | SYSTOLIC BLOOD PRESSURE: 110 MMHG | BODY MASS INDEX: 38.45 KG/M2 | HEART RATE: 64 BPM

## 2024-09-04 PROCEDURE — 99214 OFFICE O/P EST MOD 30 MIN: CPT | Mod: 25

## 2024-09-04 PROCEDURE — 20553 NJX 1/MLT TRIGGER POINTS 3/>: CPT

## 2024-09-05 NOTE — HISTORY OF PRESENT ILLNESS
[FreeTextEntry1] :  Subjective:   - Summary: Patient reports experiencing chronic headaches, daily in nature. After taking Amitriptyline as previously recommended, the symptoms remained the same. Patient associates onset of symptoms with looking at her phone and often experiences nausea. Additionally, took Advil for migraine, which only provided short-term relief   - Chief Complaint (CC): Chronic, debilitating headaches   - History of Present Illness (HPI): Patient has been suffering from headaches for an unknown duration. Symptoms have not improved over time. The headaches are daily in nature and associated with phone use and nausea. The amitriptyline recommended previously did not provide any relief. Signs include pain, nausea and occasional dizziness during phone use.   - Past Medical History: Unknown   - Past Surgical History: Unknown   - Family History: Unknown   - Social History: Unknown   - Review of Systems: Positive for headaches, nausea and dizziness.   - Medications: Amitriptyline, Advil for migraine, Celecoxib for back pain   - Allergies: Unknown   Objective:   -    - Physical Examination (PE): Patient's cervical area was assessed. Tenderness was noted to palpation on the left cervical paraspinal and occipital notch   Assessment:   - Summary: Patient's headaches appear to be chronic tension headaches exacerbated by screen time with associated migraines. There is no improvement with Amitriptyline and temporary relief with Advil   - Problems:     - Chronic Tension Headaches     - Migraines   - Differential Diagnosis:     - Occipital neuralgia     - Cluster Headaches   Plan:   - Summary: A choice between an immediate solution, an injection for quick relief, and a longer-term solution, magnesium supplement, were offered to patient. The patient chose both. Patient will take Magnesium, 400 milligrams at night for muscle relaxation and headache, and also receive an injection for immediate alleviation   - Plan:     - Continue monitoring the pattern and severity of headaches     - Advise to limit screen time     - Begin Magnesium, 400 milligrams at night     - Immediate relief treatment: Injection

## 2024-09-12 ENCOUNTER — APPOINTMENT (OUTPATIENT)
Dept: RHEUMATOLOGY | Facility: CLINIC | Age: 49
End: 2024-09-12
Payer: COMMERCIAL

## 2024-09-12 VITALS
TEMPERATURE: 97.3 F | OXYGEN SATURATION: 98 % | SYSTOLIC BLOOD PRESSURE: 114 MMHG | RESPIRATION RATE: 16 BRPM | HEIGHT: 63 IN | HEART RATE: 72 BPM | WEIGHT: 221 LBS | DIASTOLIC BLOOD PRESSURE: 79 MMHG | BODY MASS INDEX: 39.16 KG/M2

## 2024-09-12 DIAGNOSIS — M47.816 SPONDYLOSIS W/OUT MYELOPATHY OR RADICULOPATHY, LUMBAR REGION: ICD-10-CM

## 2024-09-12 PROCEDURE — 99204 OFFICE O/P NEW MOD 45 MIN: CPT

## 2024-09-12 NOTE — HISTORY OF PRESENT ILLNESS
[FreeTextEntry1] : 50 y/o F w here for initial visit  Pt reports she has lower back for years. Pt says time of day does not affect pain.   Pt reports time of day does not affect intensity. Has some stiffness.   Pt was injected angiontension complements.   No fevers, h/a, rashes, hair loss, oral ulcers, epistaxis, sinusitis,  swollen glands, dry mouth, dry eyes, CP, SOB, cough, vision changes, abdominal pain, GERD, n/v/d, blood in stool or urine, focal weakness, sensory loss,  Raynaud's, joint pain, swelling, weight loss.

## 2024-09-12 NOTE — DATA REVIEWED
[FreeTextEntry1] : ACC: [id number] EXAM: XR T SPINE MIN 4 VIEWS ACC: [id number] EXAM: XR LS SPINE AP LAT 2-3 VIEWS  PROCEDURE DATE: 06/26/2022    INTERPRETATION: CLINICAL INFORMATION: Back pain  TECHNIQUE: AP and lateral views of the thoracolumbar spine were obtained.  COMPARISON: No prior studies are available for comparison.  FINDINGS: The vertebral body heights are maintained. No acute fracture or subluxation is identified. There are no acute malalignments of the vertebral bodies. The visualized soft tissues are unremarkable. The visualized lungs are clear.  IMPRESSION: No evidence of acute compression deformity or traumatic malalignment of the thoracolumbar spine.  --- End of Report ---     HERBERT HOOKER MD; Resident Radiology This document has been electronically signed. JEANETTE MARCUM MD; Attending Radiologist This document has been electronically signed. Jun 27 2022 6:58AM     ACC: [id number] EXAM: MR SPINE CERVICAL WAW IC    PROCEDURE DATE: 06/29/2022        INTERPRETATION: CLINICAL INDICATION: Cervical radiculopathy.    TECHNIQUE: Multiplanar multisequence MRI of the cervical spine was performed before and after the intravenous administration of contrast, according to standard protocol. 7.5 ml of Gadavist IV contrast was administered.    COMPARISON: None available.    FINDINGS:    ALIGNMENT: The alignment is normal.    VERTEBRAE: The vertebral bodies are normal in height. No acute fracture or aggressive osseous lesion.    DISCS: Multilevel disc desiccation.    CORD: There is no intrinsic spinal cord signal abnormality.    ENHANCEMENT: No abnormal enhancement identified.    PARAVERTEBRAL SOFT TISSUES: The visualized paravertebral soft tissues appear within normal limits.    EVALUATION OF INDIVIDUAL LEVELS:  C2-3: No disc herniation, spinal canal or neuroforaminal stenosis.  C3-4: No disc herniation, spinal canal or neuroforaminal stenosis.  C4-5: No disc herniation, spinal canal or neuroforaminal stenosis.  C5-6: Mild disc bulge. No spinal canal or neuroforaminal stenosis.  C6-7: No disc herniation, spinal canal or neuroforaminal stenosis.  C7-T1: No disc herniation, spinal canal or neuroforaminal stenosis.    Other: Partially visualized expanded and partially empty sella.      IMPRESSION:    Minimal multilevel degenerative changes. No spinal canal or neuroforaminal stenosis. Normal appearance of the cervical cord.    --- End of Report ---    ACC: [id number] EXAM: MR SPINE LUMBAR WAW IC    PROCEDURE DATE: 06/29/2022        INTERPRETATION: CLINICAL INDICATION: Severe back pain. Status post heat ablation for varicose veins.    TECHNIQUE: Multiplanar multisequence MRI of the lumbar spine was performed before and after the intravenous administration of contrast according to standard protocol. 7.5 ml of Gadavist IV contrast was administered.    COMPARISON: Lumbar spine radiograph 6/26/2022.    FINDINGS:    ALIGNMENT: The alignment is normal.    VERTEBRAE: The vertebral bodies are normal in height. There is no fracture or aggressive osseous lesion. Mild multilevel degenerative changes.    DISCS: Multilevel disc desiccation and loss of height.    CONUS MEDULLARIS AND CAUDA EQUINA: The conus medullaris terminates at T12-L1. There is normal appearance of the conus medullaris and cauda equina.    EVALUATION OF INDIVIDUAL LEVELS:  L1-2: No disc herniation, spinal canal or neuroforaminal stenosis.    L2-3: Mild disc bulge. No spinal canal or neuroforaminal stenosis.    L3-4: Mild disc bulge. No spinal canal or neuroforaminal stenosis.    L4-5: Disc bulge with central annular fissure and superimposed shallow central disc protrusion. No spinal canal or neuroforaminal stenosis.    L5-S1: No disc herniation, spinal canal or neuroforaminal stenosis.    LIMITED EVALUATION OF UPPER SACRUM AND SACROILIAC JOINTS: Unremarkable.    PARAVERTEBRAL SOFT TISSUES AND VISUALIZED RETROPERITONEUM: The visualized paravertebral soft tissues appear within normal limits.    IMPRESSION:    Mild multilevel degenerative changes. No spinal canal or neuroforaminal stenosis. Normal appearance of the conus medullaris and cauda equina.

## 2024-09-12 NOTE — ASSESSMENT
[FreeTextEntry1] : 48 y/o F w lumbar DDD w lower back pain =lower back pain =mild stiffness =MRI lumbar spine 2022 w mild DDD, limited SI joint evaluation unremarkable   Pt w DDD. No role from a rheumatologic perspective for this condition, as it is not inflammatory. Unsure of injections gotten by other provider. Will refer to spine center.   Plan -spine center referral  No need to return to rheumatology unless symptoms change

## 2024-09-12 NOTE — PHYSICAL EXAM
[Sclera] : the sclera and conjunctiva were normal [Auscultation Breath Sounds / Voice Sounds] : lungs were clear to auscultation bilaterally [Heart Sounds] : normal S1 and S2 [Heart Rate And Rhythm] : heart rate was normal and rhythm regular [Murmurs] : no murmurs [Cervical Lymph Nodes Enlarged Posterior Bilaterally] : posterior cervical [Cervical Lymph Nodes Enlarged Anterior Bilaterally] : anterior cervical [Supraclavicular Lymph Nodes Enlarged Bilaterally] : supraclavicular [FreeTextEntry1] : ARI negative, straight leg test negative [Musculoskeletal - Swelling] : no joint swelling seen [] : no rash [Skin Lesions] : no skin lesions [Oriented To Time, Place, And Person] : oriented to person, place, and time

## 2024-09-19 ENCOUNTER — APPOINTMENT (OUTPATIENT)
Dept: INTERNAL MEDICINE | Facility: CLINIC | Age: 49
End: 2024-09-19
Payer: COMMERCIAL

## 2024-09-19 VITALS
SYSTOLIC BLOOD PRESSURE: 115 MMHG | DIASTOLIC BLOOD PRESSURE: 80 MMHG | HEART RATE: 76 BPM | HEIGHT: 63 IN | OXYGEN SATURATION: 97 % | WEIGHT: 218 LBS | BODY MASS INDEX: 38.62 KG/M2

## 2024-09-19 DIAGNOSIS — R42 DIZZINESS AND GIDDINESS: ICD-10-CM

## 2024-09-19 DIAGNOSIS — R63.5 ABNORMAL WEIGHT GAIN: ICD-10-CM

## 2024-09-19 DIAGNOSIS — E66.9 OBESITY, UNSPECIFIED: ICD-10-CM

## 2024-09-19 DIAGNOSIS — Z23 ENCOUNTER FOR IMMUNIZATION: ICD-10-CM

## 2024-09-19 DIAGNOSIS — H93.8X3 OTHER SPECIFIED DISORDERS OF EAR, BILATERAL: ICD-10-CM

## 2024-09-19 DIAGNOSIS — D50.9 IRON DEFICIENCY ANEMIA, UNSPECIFIED: ICD-10-CM

## 2024-09-19 PROCEDURE — 99214 OFFICE O/P EST MOD 30 MIN: CPT | Mod: 25

## 2024-09-19 NOTE — PHYSICAL EXAM
[Normal Outer Ear/Nose] : the outer ears and nose were normal in appearance [Normal TMs] : both tympanic membranes were normal [Normal Nasal Mucosa] : the nasal mucosa was normal [Supple] : supple [No Respiratory Distress] : no respiratory distress  [No Accessory Muscle Use] : no accessory muscle use [Clear to Auscultation] : lungs were clear to auscultation bilaterally [Normal Rate] : normal rate  [Regular Rhythm] : with a regular rhythm [Normal S1, S2] : normal S1 and S2 [Pedal Pulses Present] : the pedal pulses are present [No Edema] : there was no peripheral edema [Normal] : normal gait, coordination grossly intact, no focal deficits and deep tendon reflexes were 2+ and symmetric

## 2024-09-20 ENCOUNTER — APPOINTMENT (OUTPATIENT)
Dept: RHEUMATOLOGY | Facility: CLINIC | Age: 49
End: 2024-09-20

## 2024-09-20 LAB
ANION GAP SERPL CALC-SCNC: 12 MMOL/L
BASOPHILS # BLD AUTO: 0.02 K/UL
BASOPHILS NFR BLD AUTO: 0.4 %
BUN SERPL-MCNC: 17 MG/DL
CALCIUM SERPL-MCNC: 9 MG/DL
CHLORIDE SERPL-SCNC: 106 MMOL/L
CO2 SERPL-SCNC: 22 MMOL/L
CREAT SERPL-MCNC: 0.5 MG/DL
EGFR: 113 ML/MIN/1.73M2
EOSINOPHIL # BLD AUTO: 0.3 K/UL
EOSINOPHIL NFR BLD AUTO: 5.4 %
FERRITIN SERPL-MCNC: 13 NG/ML
FOLATE SERPL-MCNC: 10.9 NG/ML
GLUCOSE SERPL-MCNC: 93 MG/DL
HCT VFR BLD CALC: 40.4 %
HGB BLD-MCNC: 12.7 G/DL
IMM GRANULOCYTES NFR BLD AUTO: 0.5 %
IRON SATN MFR SERPL: 11 %
IRON SERPL-MCNC: 47 UG/DL
LYMPHOCYTES # BLD AUTO: 1.43 K/UL
LYMPHOCYTES NFR BLD AUTO: 25.9 %
MAN DIFF?: NORMAL
MCHC RBC-ENTMCNC: 26.5 PG
MCHC RBC-ENTMCNC: 31.4 GM/DL
MCV RBC AUTO: 84.3 FL
MONOCYTES # BLD AUTO: 0.5 K/UL
MONOCYTES NFR BLD AUTO: 9.1 %
NEUTROPHILS # BLD AUTO: 3.24 K/UL
NEUTROPHILS NFR BLD AUTO: 58.7 %
PLATELET # BLD AUTO: 292 K/UL
POTASSIUM SERPL-SCNC: 4.3 MMOL/L
RBC # BLD: 4.79 M/UL
RBC # FLD: 16.3 %
SODIUM SERPL-SCNC: 140 MMOL/L
T4 FREE SERPL-MCNC: 0.9 NG/DL
TIBC SERPL-MCNC: 436 UG/DL
TRANSFERRIN SERPL-MCNC: 351 MG/DL
TSH SERPL-ACNC: 1.04 UIU/ML
UIBC SERPL-MCNC: 389 UG/DL
VIT B12 SERPL-MCNC: 615 PG/ML
WBC # FLD AUTO: 5.52 K/UL

## 2024-09-23 NOTE — HISTORY OF PRESENT ILLNESS
[FreeTextEntry1] : Follow up visit on chronic medical conditions.  [de-identified] : Patient speaks Latvian. She presents with her  and would like to use him as the . interpretation services declined.   Patient is a 49-year-old F with hx of anemia, varicose veins, venous ablation and back pain coming in for a follow up visit on chronic medical conditions. #HELENE- s/p EGD 2022- gastritis. was scheduled to have capsule endoscopy. patient missed the appointment. She will reschedule it. Hematology visit pending. referral renewed. Ran out of ferrous sulfate and vitamin C. will provide refills.  She denies chest pain, palpitations, denies SOB, PELAYO.   # Patient is c/o on and off dizziness for the past few weeks. Has seen neuro for the similar complaints. denies any headache, blurry vision, and denies focal weakness.   # Weight gain- Patient reports that she has been gaining weight lately. She is eating healthy with more greens avoiding bread, rice, meat. She is interested to see weight management.   # She is also c/o heaviness in her ears. it feels like ears are clogged. She denies hearing loss, denies earache, denies tinnitus, denies fever, chills, denies URIs. requesting to see ENT.

## 2024-09-23 NOTE — HISTORY OF PRESENT ILLNESS
[FreeTextEntry1] : Follow up visit on chronic medical conditions.  [de-identified] : Patient speaks Slovenian. She presents with her  and would like to use him as the . interpretation services declined.   Patient is a 49-year-old F with hx of anemia, varicose veins, venous ablation and back pain coming in for a follow up visit on chronic medical conditions. #HELENE- s/p EGD 2022- gastritis. was scheduled to have capsule endoscopy. patient missed the appointment. She will reschedule it. Hematology visit pending. referral renewed. Ran out of ferrous sulfate and vitamin C. will provide refills.  She denies chest pain, palpitations, denies SOB, PELAYO.   # Patient is c/o on and off dizziness for the past few weeks. Has seen neuro for the similar complaints. denies any headache, blurry vision, and denies focal weakness.   # Weight gain- Patient reports that she has been gaining weight lately. She is eating healthy with more greens avoiding bread, rice, meat. She is interested to see weight management.   # She is also c/o heaviness in her ears. it feels like ears are clogged. She denies hearing loss, denies earache, denies tinnitus, denies fever, chills, denies URIs. requesting to see ENT.

## 2024-10-02 ENCOUNTER — APPOINTMENT (OUTPATIENT)
Dept: BARIATRICS/WEIGHT MGMT | Facility: CLINIC | Age: 49
End: 2024-10-02

## 2024-10-16 ENCOUNTER — NON-APPOINTMENT (OUTPATIENT)
Age: 49
End: 2024-10-16

## 2024-11-05 ENCOUNTER — RESULT REVIEW (OUTPATIENT)
Age: 49
End: 2024-11-05

## 2024-11-05 ENCOUNTER — APPOINTMENT (OUTPATIENT)
Dept: HEMATOLOGY ONCOLOGY | Facility: CLINIC | Age: 49
End: 2024-11-05
Payer: COMMERCIAL

## 2024-11-05 VITALS
TEMPERATURE: 99 F | HEART RATE: 73 BPM | SYSTOLIC BLOOD PRESSURE: 105 MMHG | DIASTOLIC BLOOD PRESSURE: 71 MMHG | OXYGEN SATURATION: 99 % | BODY MASS INDEX: 40.01 KG/M2 | RESPIRATION RATE: 16 BRPM | HEIGHT: 62.6 IN | WEIGHT: 223 LBS

## 2024-11-05 DIAGNOSIS — D64.9 ANEMIA, UNSPECIFIED: ICD-10-CM

## 2024-11-05 PROCEDURE — 99204 OFFICE O/P NEW MOD 45 MIN: CPT

## 2024-11-05 PROCEDURE — G2211 COMPLEX E/M VISIT ADD ON: CPT

## 2024-11-08 ENCOUNTER — APPOINTMENT (OUTPATIENT)
Dept: INTERNAL MEDICINE | Facility: CLINIC | Age: 49
End: 2024-11-08

## 2024-11-13 ENCOUNTER — APPOINTMENT (OUTPATIENT)
Dept: CARDIOLOGY | Facility: CLINIC | Age: 49
End: 2024-11-13
Payer: COMMERCIAL

## 2024-11-13 ENCOUNTER — NON-APPOINTMENT (OUTPATIENT)
Age: 49
End: 2024-11-13

## 2024-11-13 VITALS
SYSTOLIC BLOOD PRESSURE: 91 MMHG | BODY MASS INDEX: 38.76 KG/M2 | DIASTOLIC BLOOD PRESSURE: 49 MMHG | OXYGEN SATURATION: 99 % | WEIGHT: 216 LBS | HEART RATE: 73 BPM | RESPIRATION RATE: 16 BRPM

## 2024-11-13 DIAGNOSIS — R06.02 SHORTNESS OF BREATH: ICD-10-CM

## 2024-11-13 DIAGNOSIS — R07.9 CHEST PAIN, UNSPECIFIED: ICD-10-CM

## 2024-11-13 PROCEDURE — 99204 OFFICE O/P NEW MOD 45 MIN: CPT | Mod: 25

## 2024-11-13 PROCEDURE — 93000 ELECTROCARDIOGRAM COMPLETE: CPT

## 2024-11-14 ENCOUNTER — APPOINTMENT (OUTPATIENT)
Dept: INFUSION THERAPY | Facility: HOSPITAL | Age: 49
End: 2024-11-14

## 2024-12-24 NOTE — H&P ADULT - POSTERIOR CERVICAL R
Called pt left voicemail requesting to know what kind of work he does as asked by Dr. Goff via [employer]   normal

## (undated) DEVICE — TUBING MEDI-VAC W MAXIGRIP CONNECTORS 1/4"X6'

## (undated) DEVICE — DRSG 2X2

## (undated) DEVICE — DRSG BANDAID 0.75X3"

## (undated) DEVICE — LINE BREATHE SAMPLNG

## (undated) DEVICE — SALIVA EJECTOR (BLUE)

## (undated) DEVICE — TUBING SUCTION NONCONDUCTIVE 6MM X 12FT

## (undated) DEVICE — BIOPSY FORCEP COLD DISP

## (undated) DEVICE — CONTAINER FORMALIN 10% 20ML

## (undated) DEVICE — CATH IV SAFE BC 22G X 1" (BLUE)

## (undated) DEVICE — BASIN EMESIS 10IN GRADUATED MAUVE

## (undated) DEVICE — BIOPSY FORCEP RADIAL JAW 4 STANDARD WITH NEEDLE

## (undated) DEVICE — ELCTR ECG CONDUCTIVE ADHESIVE

## (undated) DEVICE — GOWN LG

## (undated) DEVICE — DRSG CURITY GAUZE SPONGE 4 X 4" 12-PLY NON-STERILE

## (undated) DEVICE — TUBING IV SET GRAVITY 3Y 100" MACRO

## (undated) DEVICE — CONTAINER FORMALIN 80ML YELLOW

## (undated) DEVICE — PACK IV START WITH CHG

## (undated) DEVICE — ELCTR GROUNDING PAD ADULT COVIDIEN

## (undated) DEVICE — LUBRICATING JELLY HR ONE SHOT 3G